# Patient Record
Sex: FEMALE | Race: BLACK OR AFRICAN AMERICAN
[De-identification: names, ages, dates, MRNs, and addresses within clinical notes are randomized per-mention and may not be internally consistent; named-entity substitution may affect disease eponyms.]

---

## 2017-04-14 ENCOUNTER — HOSPITAL ENCOUNTER (EMERGENCY)
Dept: HOSPITAL 75 - ER | Age: 22
Discharge: HOME | End: 2017-04-14
Payer: SELF-PAY

## 2017-04-14 VITALS — HEIGHT: 64 IN | WEIGHT: 110 LBS | BODY MASS INDEX: 18.78 KG/M2
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
## 2017-04-14 DIAGNOSIS — S51.811A: Primary | ICD-10-CM

## 2017-04-14 DIAGNOSIS — F17.210: ICD-10-CM

## 2017-04-14 PROCEDURE — 99282 EMERGENCY DEPT VISIT SF MDM: CPT

## 2017-04-14 PROCEDURE — 73090 X-RAY EXAM OF FOREARM: CPT

## 2017-04-14 NOTE — ED UPPER EXTREMITY
General


Chief Complaint:  Laceration


Stated Complaint:  RIGHT ARM LAC


Nursing Triage Note:  


PT HIT A WINDOW IN ANGER AND HER RIGHT ARM WENT THROUGH IT.  PT HAS TWO DEEP 


LACERATIONS APPROXIMATELY 2 INCHES LONG TO THE RIGHT FOREARM.  BLEEDING IS 


MINIMAL AT THIS TIME.  RIGHT RADIAL PULSE PRESENT


Source:  patient


Exam Limitations:  no limitations





History of Present Illness


Time seen by provider:  11:00


Initial Comments


to ER with laceration to the volar side of the right forearm after she punched 

a window at home out of anger


Onset:  just prior to arrival


Severity:  moderate


Pain/Injury Location:  right forearm


Method of Injury:  unknown


Modifying Factors:  Worse With Movement





Allergies and Home Medications


Allergies


Coded Allergies:  


     No Known Drug Allergies (Verified  Allergy, Unknown, 12/4/07)





Constitutional:  see HPI


EENTM:  see HPI


Respiratory:  no symptoms reported


Cardiovascular:  no symptoms reported


Genitourinary:  no symptoms reported


Musculoskeletal:  no symptoms reported


Skin:  see HPI


Psychiatric/Neurological:  No Symptoms Reported





Past Medical-Social-Family Hx


Patient Social History


Alcohol Use:  Occasionally Uses


Recreational Drug Use:  Yes


Drug of Choice:  MARIJUANA


Smoking Status:  Current Everyday Smoker


Type Used:  Cigarettes


Recent Foreign Travel:  No


Contact w/Someone Who Travel:  No


Recent Infectious Disease Expo:  No


Recent Hopitalizations:  Yes (only at age 6 when spleen repaired and 

appendectomy )





Surgeries


HX Surgeries:  Yes


Surgeries:  Abdominal, Eye Surgery





Respiratory


Hx Respiratory Disorders:  No





Cardiovascular


Hx Cardiac Disorders:  No





Neurological


Hx Neurological Disorders:  No





Reproductive System


Hx Reproductive Disorders:  No


Female Reproductive Disorders:  Denies





Genitourinary


Hx Genitourinary Disorders:  Yes


Genitourinary Disorders:  UTI-Chronic





Gastrointestinal


Hx Gastrointestinal Disorders:  Yes





Musculoskeletal


Hx Musculoskeletal Disorders:  No





Endocrine


Hx Endocrine Disorders:  No





HEENT


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 )





Cancer


Hx Cancer:  No





Psychosocial


Hx Psychiatric Problems:  No





Integumentary


HX Skin/Integumentary Disorder:  No





Blood Transfusions


Hx Blood Disorders:  No





Physical Exam


Vital Signs





Vital Sign - Last 12Hours








 4/14/17





 10:43


 


Temp 98.0


 


Pulse 70


 


Resp 16


 


B/P (MAP) 127/102





Capillary Refill :


General Appearance:  WD/WN, no apparent distress


HEENT:  PERRL/EOMI, normal ENT inspection


Neck:  non-tender, full range of motion


Respiratory:  no respiratory distress, no accessory muscle use


Gastrointestinal:  non tender, soft


Shoulder:  normal inspection, non-tender


Elbow/Forearm:  Right, limited ROM (several lacerations to the volar surface of 

the right forearm to the longest her about 4 cm in length and depth is to the 

muscle fascia.  She has strong radial pulse.  I'm not able to palpate the ulnar 

pulse but she has a positive Oumar's test.)





Laceration Repair :  


   Wound Location:  Upper Extremities


   Wound Length (cm):  9


   Wound's Depth, Shape:  into muscle


   Wound Explored:  clean


   Irrigated w/ Saline (ccs):  200


   Anesthesia:  Lidocaine w/ Epi


   Volume Anesthetic (ccs):  10


   Suture:  Prolene


   Suture Size:  4-0


   Number of Sutures:  9


   Layer Closure?:  1


   Number Deep Layer Sutures:  0


Progress


10 mL of 1 percent lidocaine with epinephrine.  Wound was then closed with a 

total of 7 simple interrupted sutures size 4-0 Prolene and 2 running sutures 

size 4-0 Prolene.





Progress/Results/Core Measures


Results/Orders


My Orders





Orders - BRANDY HALL


Lidocaine/Epi 1% 1:100,000 (Xylocaine /E (4/14/17 11:00)


Forearm, Right, 2 Views (4/14/17 11:00)





Medications Given in ED





Current Medications








 Medications  Dose


 Ordered  Sig/Dheeraj


 Route  Start Time


 Stop Time Status Last Admin


Dose Admin


 


 Lidocaine/


 Epinephrine  20 ml  ONCE  ONCE


 INJ  4/14/17 11:00


 4/14/17 11:01 DC 4/14/17 11:11


10 ML








Vital Signs/I&O





Vital Sign - Last 12Hours








 4/14/17





 10:43


 


Temp 98.0


 


Pulse 70


 


Resp 16


 


B/P (MAP) 127/102











Departure


Impression


Impression:  


 Primary Impression:  


 Forearm laceration


Disposition:  01 HOME, SELF-CARE


Condition:  Stable





Departure-Patient Inst.


Decision time for Depature:  11:43


Referrals:  


[facility] (PCP/Family)


Primary Care Physician


Patient Instructions:  Laceration Repair With Stitches (DC)





Add. Discharge Instructions:  


1.  Keep this area clean and covered and dry today


2.  Starting tomorrow you may take the bandage off leaving it open to air.  If 

you're at work he may keep it covered with a Band-Aid


3.  You may let water run over it in the shower starting tomorrow


4.  Return to ER for any infection signs such as redness, pus like drainage or 

any other concerns


5.  Keep the dressing on it today


6.  Antibiotics as directed


7.  Return to the emergency room in 7-10 days, probably closer to 10 days to 

have the stitches removed


 All discharge instructions reviewed with patient and/or family. Voiced 

understanding.


Scripts


Cephalexin (Cephalexin) 500 Mg Capsule


500 MG PO TID for 7 Days, CAP


   Prov: BRANDY HALL         4/14/17











BRANDY HALL Apr 14, 2017 11:01

## 2017-04-14 NOTE — DIAGNOSTIC IMAGING REPORT
INDICATION: Lacerations by glass.



FINDINGS: Extensive soft tissue irregularities involving the mid

third of the forearm along its palmar and ulnar aspect. Air and

fluid and soft tissue distortion owing to the lacerations could

obscure an underlying foreign body. No opaque foreign body is

found. There is no fracture.



IMPRESSION: Extensive soft tissue injury, but no appreciable

retained opaque foreign body or osseous injury.



Dictated by:



Dictated on workstation # [id number]

## 2017-04-22 ENCOUNTER — HOSPITAL ENCOUNTER (EMERGENCY)
Dept: HOSPITAL 75 - ER | Age: 22
Discharge: HOME | End: 2017-04-22
Payer: SELF-PAY

## 2017-04-22 VITALS — DIASTOLIC BLOOD PRESSURE: 74 MMHG | SYSTOLIC BLOOD PRESSURE: 138 MMHG

## 2017-04-22 VITALS — BODY MASS INDEX: 18.85 KG/M2 | HEIGHT: 64 IN | WEIGHT: 110.38 LBS

## 2017-04-22 DIAGNOSIS — S51.811D: Primary | ICD-10-CM

## 2017-04-25 ENCOUNTER — HOSPITAL ENCOUNTER (EMERGENCY)
Dept: HOSPITAL 75 - ER | Age: 22
Discharge: HOME | End: 2017-04-25
Payer: SELF-PAY

## 2017-04-25 VITALS — HEIGHT: 64 IN | BODY MASS INDEX: 18.44 KG/M2 | WEIGHT: 108 LBS

## 2017-04-25 VITALS — SYSTOLIC BLOOD PRESSURE: 120 MMHG | DIASTOLIC BLOOD PRESSURE: 89 MMHG

## 2017-04-25 DIAGNOSIS — S51.811D: Primary | ICD-10-CM

## 2017-04-28 ENCOUNTER — HOSPITAL ENCOUNTER (EMERGENCY)
Dept: HOSPITAL 75 - ER | Age: 22
Discharge: HOME | End: 2017-04-28
Payer: SELF-PAY

## 2017-04-28 VITALS — BODY MASS INDEX: 18.44 KG/M2 | WEIGHT: 108 LBS | HEIGHT: 64.25 IN

## 2017-04-28 DIAGNOSIS — F12.90: ICD-10-CM

## 2017-04-28 DIAGNOSIS — R55: Primary | ICD-10-CM

## 2017-04-28 LAB
ALBUMIN SERPL-MCNC: 4.1 G/DL (ref 3.2–4.5)
ALT SERPL-CCNC: 6 U/L (ref 0–55)
ANION GAP SERPL CALC-SCNC: 11 MMOL/L (ref 5–14)
AST SERPL-CCNC: 16 U/L (ref 5–34)
BASOPHILS # BLD AUTO: 0 10^3/UL (ref 0–0.1)
BASOPHILS NFR BLD AUTO: 1 % (ref 0–10)
BILIRUB SERPL-MCNC: 0.6 MG/DL (ref 0.1–1)
BILIRUB UR QL STRIP: NEGATIVE
BUN SERPL-MCNC: 15 MG/DL (ref 7–18)
BUN/CREAT SERPL: 20
CALCIUM SERPL-MCNC: 9.2 MG/DL (ref 8.5–10.1)
CHLORIDE SERPL-SCNC: 107 MMOL/L (ref 98–107)
CO2 SERPL-SCNC: 19 MMOL/L (ref 21–32)
CREAT SERPL-MCNC: 0.74 MG/DL (ref 0.6–1.3)
EOSINOPHIL # BLD AUTO: 0.1 10^3/UL (ref 0–0.3)
EOSINOPHIL NFR BLD AUTO: 2 % (ref 0–10)
ERYTHROCYTE [DISTWIDTH] IN BLOOD BY AUTOMATED COUNT: 16.3 % (ref 10–14.5)
GFR SERPLBLD BASED ON 1.73 SQ M-ARVRAT: > 60 ML/MIN
GLUCOSE SERPL-MCNC: 81 MG/DL (ref 70–105)
KETONES UR QL STRIP: NEGATIVE
LEUKOCYTE ESTERASE UR QL STRIP: NEGATIVE
LYMPHOCYTES # BLD AUTO: 1.6 X 10^3 (ref 1–4)
LYMPHOCYTES NFR BLD AUTO: 29 % (ref 12–44)
MCH RBC QN AUTO: 20 PG (ref 25–34)
MCHC RBC AUTO-ENTMCNC: 30 G/DL (ref 32–36)
MCV RBC AUTO: 66 FL (ref 80–99)
MONOCYTES # BLD AUTO: 0.7 X 10^3 (ref 0–1)
MONOCYTES NFR BLD AUTO: 12 % (ref 0–12)
NEUTROPHILS # BLD AUTO: 3 X 10^3 (ref 1.8–7.8)
NEUTROPHILS NFR BLD AUTO: 56 % (ref 42–75)
NITRITE UR QL STRIP: NEGATIVE
PH UR STRIP: 8 [PH] (ref 5–9)
PLATELET # BLD: 211 10^3/UL (ref 130–400)
PMV BLD AUTO: 11.9 FL (ref 7.4–10.4)
POTASSIUM SERPL-SCNC: 3.8 MMOL/L (ref 3.6–5)
PROT SERPL-MCNC: 7.2 G/DL (ref 6.4–8.2)
PROT UR QL STRIP: NEGATIVE
RBC # BLD AUTO: 4.79 10^6/UL (ref 4.35–5.85)
SODIUM SERPL-SCNC: 137 MMOL/L (ref 135–145)
SP GR UR STRIP: 1.01 (ref 1.02–1.02)
SQUAMOUS #/AREA URNS HPF: (no result) /HPF
UROBILINOGEN UR-MCNC: NORMAL MG/DL
WBC # BLD AUTO: 5.4 10^3/UL (ref 4.3–11)
WBC #/AREA URNS HPF: (no result) /HPF

## 2017-04-28 PROCEDURE — 85379 FIBRIN DEGRADATION QUANT: CPT

## 2017-04-28 PROCEDURE — 36415 COLL VENOUS BLD VENIPUNCTURE: CPT

## 2017-04-28 PROCEDURE — 84703 CHORIONIC GONADOTROPIN ASSAY: CPT

## 2017-04-28 PROCEDURE — 80053 COMPREHEN METABOLIC PANEL: CPT

## 2017-04-28 PROCEDURE — 96360 HYDRATION IV INFUSION INIT: CPT

## 2017-04-28 PROCEDURE — 80306 DRUG TEST PRSMV INSTRMNT: CPT

## 2017-04-28 PROCEDURE — 85025 COMPLETE CBC W/AUTO DIFF WBC: CPT

## 2017-04-28 PROCEDURE — 81000 URINALYSIS NONAUTO W/SCOPE: CPT

## 2017-04-28 NOTE — ED SYNCOPE
General


Chief Complaint:  Dizziness/Syncope


Stated Complaint:  FAINTING


Source of Information:  Patient


Exam Limitations:  No Limitations





History of Present Illness


Time Seen by Provider:  18:20


Initial Comments


To ER with c/o syncope tonight while at work at [employer].  She 

states that immediately before passing out she felt very hot and flushed and 

she could hear voices seemingly getting further away.  She states that she then 

remembers just waking up.  Afterwards she did vomit once.  She's never passed 

out before though she did nearly pass out a few days ago while she was here 

having sutures removed from the right forearm.  She did smoke marijuana earlier 

today.


Timing/Prior Episodes:  No Prior History


Precipitating Factors:  None


Loss of Consciousness:  No Loss of Consciousness


Current Symptoms:  No Headache, No Nausea





Allergies and Home Medications


Allergies


Coded Allergies:  


     No Known Drug Allergies (Verified  Allergy, Unknown, 12/4/07)





Home Medications


Cephalexin 500 Mg Capsule, 500 MG PO TID for 7 Days


   Prescribed by: BRANDY HALL on 4/14/17 1385





Constitutional:  see HPI


EENTM:  see HPI


Respiratory:  see HPI, No cough, No hemoptysis, No short of breath


Cardiovascular:  no symptoms reported


Genitourinary:  no symptoms reported


Musculoskeletal:  no symptoms reported


Skin:  no symptoms reported


Psychiatric/Neurological:  No Symptoms Reported





Past Medical-Social-Family Hx


Patient Social History


Drug of Choice:  MARIJUANA


Type Used:  Cigarettes


Recent Hopitalizations:  Yes (only at age 6 when spleen repaired and 

appendectomy )





Surgeries


HX Surgeries:  Yes


Surgeries:  Abdominal, Eye Surgery





Respiratory


Hx Respiratory Disorders:  No





Cardiovascular


Hx Cardiac Disorders:  No





Neurological


Hx Neurological Disorders:  No





Reproductive System


Hx Reproductive Disorders:  No


Female Reproductive Disorders:  Denies





Genitourinary


Hx Genitourinary Disorders:  Yes


Genitourinary Disorders:  UTI-Chronic





Gastrointestinal


Hx Gastrointestinal Disorders:  Yes





Musculoskeletal


Hx Musculoskeletal Disorders:  No





Endocrine


Hx Endocrine Disorders:  No





HEENT


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 )





Cancer


Hx Cancer:  No





Psychosocial


Hx Psychiatric Problems:  No





Integumentary


HX Skin/Integumentary Disorder:  No





Blood Transfusions


Hx Blood Disorders:  No





Physical Exam


Vital Signs





Vital Sign - Last 12Hours








 4/28/17





 18:14


 


Temp 99.2


 


Pulse 97


 


Resp 20


 


B/P (MAP) 129/98


 


O2 Delivery Room Air





Capillary Refill :


General Appearance:  No Apparent Distress, WD/WN


HEENT:  PERRL/EOMI, TMs Normal


Neck:  Full Range of Motion, Normal Inspection


Cardiovascular:  Regular Rate, Rhythm, Normal Peripheral Pulses


Respiratory:  Normal Breath Sounds, No Accessory Muscle Use, No Respiratory 

Distress


Gastrointestinal:  Non Tender, Soft


Neurologic/Psychiatric:  Alert, Oriented x3, No Motor/Sensory Deficits


Cranial Nerves:  Normal Hearing, Normal Speech, PERRL


Skin:  Normal Color, Warm/Dry





Laceration Repair :  


   Suture Size:  4-0





Progress/Results/Core Measures


Results/Orders


Lab Results





Laboratory Tests








Test


  4/28/17


18:43 4/28/17


18:44 Range/Units


 


 


White Blood Count


  5.4 


  


  4.3-11.0


10^3/uL


 


Red Blood Count


  4.79 


  


  4.35-5.85


10^6/uL


 


Hemoglobin 9.6 L  11.5-16.0  G/DL


 


Hematocrit 32 L  35-52  %


 


Mean Corpuscular Volume 66 L  80-99  FL


 


Mean Corpuscular Hemoglobin 20 L  25-34  PG


 


Mean Corpuscular Hemoglobin


Concent 30 L


  


  32-36  G/DL


 


 


Red Cell Distribution Width 16.3 H  10.0-14.5  %


 


Platelet Count


  211 


  


  130-400


10^3/uL


 


Mean Platelet Volume 11.9 H  7.4-10.4  FL


 


Neutrophils (%) (Auto) 56   42-75  %


 


Lymphocytes (%) (Auto) 29   12-44  %


 


Monocytes (%) (Auto) 12   0-12  %


 


Eosinophils (%) (Auto) 2   0-10  %


 


Basophils (%) (Auto) 1   0-10  %


 


Neutrophils # (Auto) 3.0   1.8-7.8  X 10^3


 


Lymphocytes # (Auto) 1.6   1.0-4.0  X 10^3


 


Monocytes # (Auto) 0.7   0.0-1.0  X 10^3


 


Eosinophils # (Auto)


  0.1 


  


  0.0-0.3


10^3/uL


 


Basophils # (Auto)


  0.0 


  


  0.0-0.1


10^3/uL


 


D-Dimer


  0.35 


  


  0.00-0.49


UG/ML


 


Sodium Level 137   135-145  MMOL/L


 


Potassium Level 3.8   3.6-5.0  MMOL/L


 


Chloride Level 107     MMOL/L


 


Carbon Dioxide Level 19 L  21-32  MMOL/L


 


Anion Gap 11   5-14  MMOL/L


 


Blood Urea Nitrogen 15   7-18  MG/DL


 


Creatinine


  0.74 


  


  0.60-1.30


MG/DL


 


Estimat Glomerular Filtration


Rate > 60 


  


   


 


 


BUN/Creatinine Ratio 20    


 


Glucose Level 81     MG/DL


 


Calcium Level 9.2   8.5-10.1  MG/DL


 


Total Bilirubin 0.6   0.1-1.0  MG/DL


 


Aspartate Amino Transf


(AST/SGOT) 16 


  


  5-34  U/L


 


 


Alanine Aminotransferase


(ALT/SGPT) 6 


  


  0-55  U/L


 


 


Alkaline Phosphatase 43     U/L


 


Total Protein 7.2   6.4-8.2  G/DL


 


Albumin 4.1   3.2-4.5  G/DL


 


Urine Color  YELLOW   


 


Urine Clarity


  


  SLIGHTLY


CLOUDY  


 


 


Urine pH  8  5-9  


 


Urine Specific Gravity  1.015 L 1.016-1.022  


 


Urine Protein  NEGATIVE  NEGATIVE  


 


Urine Glucose (UA)  NEGATIVE  NEGATIVE  


 


Urine Ketones  NEGATIVE  NEGATIVE  


 


Urine Nitrite  NEGATIVE  NEGATIVE  


 


Urine Bilirubin  NEGATIVE  NEGATIVE  


 


Urine Urobilinogen  NORMAL  NORMAL  MG/DL


 


Urine Leukocyte Esterase  NEGATIVE  NEGATIVE  


 


Urine RBC (Auto)  NEGATIVE  NEGATIVE  


 


Urine RBC  NONE   /HPF


 


Urine WBC  NONE   /HPF


 


Urine Squamous Epithelial


Cells 


  NONE 


   /HPF


 


 


Urine Crystals  NONE   /LPF


 


Urine Amorphous Sediment


  


  LARGE BRENTON


PHOSPHATE H  /LPF


 


 


Urine Bacteria  NEGATIVE   /HPF


 


Urine Casts  NONE   /LPF


 


Urine Mucus  NEGATIVE   /LPF


 


Urine Culture Indicated  NO   


 


Urine Opiates Screen  NEGATIVE  NEGATIVE  


 


Urine Oxycodone Screen  NEGATIVE  NEGATIVE  


 


Urine Methadone Screen  NEGATIVE  NEGATIVE  


 


Urine Propoxyphene Screen  NEGATIVE  NEGATIVE  


 


Urine Barbiturates Screen  NEGATIVE  NEGATIVE  


 


Ur Tricyclic Antidepressants


Screen 


  NEGATIVE 


  NEGATIVE  


 


 


Urine Phencyclidine Screen  NEGATIVE  NEGATIVE  


 


Urine Amphetamines Screen  NEGATIVE  NEGATIVE  


 


Urine Methamphetamines Screen  NEGATIVE  NEGATIVE  


 


Urine Benzodiazepines Screen  NEGATIVE  NEGATIVE  


 


Urine Cocaine Screen  NEGATIVE  NEGATIVE  


 


Urine Cannabinoids Screen  POSITIVE H NEGATIVE  








My Orders





Orders - BRANDY HALL APRMARCIO


Cbc With Automated Diff (4/28/17 18:19)


Fibrin Degradation Products (4/28/17 18:19)


Comprehensive Metabolic Panel (4/28/17 18:19)


Continuous Ekg Monitoring (4/28/17 18:19)


Saline Lock/Iv-Start (4/28/17 18:19)


Ns Iv 1000 Ml (Sodium Chloride 0.9%) (4/28/17 18:30)


Ua Culture If Indicated (4/28/17 18:27)


Urine Pregnancy Bedside (4/28/17 18:27)


Drug Screen Stat (Urine) (4/28/17 18:27)





Vital Signs/I&O





Vital Sign - Last 12Hours








 4/28/17





 18:14


 


Temp 99.2


 


Pulse 97


 


Resp 20


 


B/P (MAP) 129/98


 


O2 Delivery Room Air











Departure


Communication


Progress Notes


1918-ALert, laughing, pleasant, stable vitals during ER stay.l





Impression


Impression:  


 Primary Impression:  


 Syncope and collapse


Disposition:  01 HOME, SELF-CARE


Condition:  Stable





Departure-Patient Inst.


Decision time for Depature:  19:18


Referrals:  


NO,LOCAL PHYSICIAN (PCP/Family)


Primary Care Physician


Patient Instructions:  Syncope (Fainting) (DC)





Add. Discharge Instructions:  


1. Drink plenty of fluids


2. Return to ER for any recurrent lightheadedness, palpitations, shortness of 

breath or other concerns


All discharge instructions reviewed with patient and/or family. Voiced 

understanding.











BRANDY HALL APRN Apr 28, 2017 18:22

## 2018-01-25 ENCOUNTER — HOSPITAL ENCOUNTER (OUTPATIENT)
Dept: HOSPITAL 75 - RAD | Age: 23
End: 2018-01-25
Attending: FAMILY MEDICINE
Payer: MEDICAID

## 2018-01-25 DIAGNOSIS — Z34.01: Primary | ICD-10-CM

## 2018-01-25 DIAGNOSIS — Z3A.09: ICD-10-CM

## 2018-01-25 PROCEDURE — 76801 OB US < 14 WKS SINGLE FETUS: CPT

## 2018-01-25 NOTE — DIAGNOSTIC IMAGING REPORT
PROCEDURE: US OB SINGLE FETUS <14 WKS.



TECHNIQUE: Multiple real-time grayscale images were obtained over

the gravid uterus in various projections. 



INDICATION: Dating.



FINDINGS: There is an intrauterine gestational sac containing a

fetal pole. Crown-rump length measurement is 2.6 cm consistent

with 9 weeks 3 days gestation. Fetal heart rate was recorded at

169 beats per minute. No miley-gestational sac hemorrhage is

detected. Yolk sac is visualized. The ovaries are not visualized.

No adnexal mass or free fluid is seen.



IMPRESSION: Single live IUP 9 weeks 3 days gestational age. The

estimated date of confinement sonographically is 08/27/2018.



Dictated by: 



  Dictated on workstation # [id number]

## 2018-07-02 ENCOUNTER — HOSPITAL ENCOUNTER (OUTPATIENT)
Dept: HOSPITAL 75 - LAB | Age: 23
End: 2018-07-02
Attending: FAMILY MEDICINE
Payer: MEDICAID

## 2018-07-02 DIAGNOSIS — O36.1930: Primary | ICD-10-CM

## 2018-07-02 PROCEDURE — 86886 COOMBS TEST INDIRECT TITER: CPT

## 2018-07-26 ENCOUNTER — HOSPITAL ENCOUNTER (OUTPATIENT)
Dept: HOSPITAL 75 - RAD | Age: 23
End: 2018-07-26
Attending: FAMILY MEDICINE
Payer: MEDICAID

## 2018-07-26 DIAGNOSIS — Z3A.35: ICD-10-CM

## 2018-07-26 DIAGNOSIS — O26.843: Primary | ICD-10-CM

## 2018-07-26 PROCEDURE — 76816 OB US FOLLOW-UP PER FETUS: CPT

## 2018-07-26 NOTE — DIAGNOSTIC IMAGING REPORT
INDICATION: Decreased fundal height.



TECHNIQUE: Multiple real-time grayscale images were obtained over

the gravid uterus.



COMPARISON: 01/25/2018.



FINDINGS: The previous OB ultrasound exam of 01/25/2018 noted a

single live fetus at approximately 9 weeks 3 days gestation. On

this exam, the fetus is again identified. The fetus is cephalic

in presentation. Fetal heart motion was noted and a rate of 160

BPM was recorded. A complete fetal survey was not obtained.

Reportedly, the patient has had a prior fetal survey at [facility] in [place], [place].



The amount of fluid volume is 12 cm (normal 8 to 22 cm). The

placenta is anterior and there is no previa. The placenta is

grade 3.



The fetal growth parameters average 35 weeks 4 days +/-3.5 weeks.

The estimated fetal weight is in the 74th percentile.



The cervix was not well visualized due to the low position of the

fetal head.



IMPRESSION:

1. There is a single live fetus at approximately 35 weeks 3 days

gestation +/-1 week. The EDC remains August 27, 2018.

2. There were no obvious fetal abnormalities identified.

3. The fetal growth parameters have progressed as expected since

the prior exam.



Biometrical measurements are as follows:

Biparietal 8.3 cm, age 33 weeks 3 days.

Head circumference 31.31 cm, age 35 weeks 1 days.

Abdominal circumference 33.52 cm, age 37 weeks 3 days.

Femur length 7.03 cm, age 36 weeks 1 days.



Sonographic estimate age: 35 weeks 4 days.

Sonographic estimated date of delivery: 8/26/2018.



Estimated Fetal Weight: 2915 gm (+/- 426  gm).

LMP percentile: 74%.



Fetal heart rate: 160 beats per minute.



Fetal number: 1 of 1.



Dictated by: 



  Dictated on workstation # [id number]

## 2018-08-26 ENCOUNTER — HOSPITAL ENCOUNTER (OUTPATIENT)
Dept: HOSPITAL 75 - WSO | Age: 23
Discharge: HOME | End: 2018-08-26
Attending: FAMILY MEDICINE
Payer: MEDICAID

## 2018-08-26 VITALS — BODY MASS INDEX: 24.7 KG/M2 | WEIGHT: 148.25 LBS | HEIGHT: 65 IN

## 2018-08-26 VITALS — DIASTOLIC BLOOD PRESSURE: 76 MMHG | SYSTOLIC BLOOD PRESSURE: 133 MMHG

## 2018-08-26 VITALS — DIASTOLIC BLOOD PRESSURE: 87 MMHG | SYSTOLIC BLOOD PRESSURE: 152 MMHG

## 2018-08-26 VITALS — DIASTOLIC BLOOD PRESSURE: 77 MMHG | SYSTOLIC BLOOD PRESSURE: 130 MMHG

## 2018-08-26 DIAGNOSIS — Z3A.39: ICD-10-CM

## 2018-08-26 DIAGNOSIS — O47.1: Primary | ICD-10-CM

## 2018-08-26 PROCEDURE — 96374 THER/PROPH/DIAG INJ IV PUSH: CPT

## 2018-08-26 PROCEDURE — 99214 OFFICE O/P EST MOD 30 MIN: CPT

## 2018-08-26 PROCEDURE — 96361 HYDRATE IV INFUSION ADD-ON: CPT

## 2018-08-28 ENCOUNTER — HOSPITAL ENCOUNTER (OUTPATIENT)
Dept: HOSPITAL 75 - WSO | Age: 23
Discharge: HOME | End: 2018-08-28
Attending: FAMILY MEDICINE
Payer: MEDICAID

## 2018-08-28 VITALS — DIASTOLIC BLOOD PRESSURE: 82 MMHG | SYSTOLIC BLOOD PRESSURE: 131 MMHG

## 2018-08-28 VITALS — WEIGHT: 148.25 LBS | BODY MASS INDEX: 24.7 KG/M2 | HEIGHT: 65 IN

## 2018-08-28 VITALS — DIASTOLIC BLOOD PRESSURE: 86 MMHG | SYSTOLIC BLOOD PRESSURE: 127 MMHG

## 2018-08-28 VITALS — DIASTOLIC BLOOD PRESSURE: 86 MMHG | SYSTOLIC BLOOD PRESSURE: 138 MMHG

## 2018-08-28 VITALS — SYSTOLIC BLOOD PRESSURE: 135 MMHG | DIASTOLIC BLOOD PRESSURE: 97 MMHG

## 2018-08-28 DIAGNOSIS — Z3A.40: ICD-10-CM

## 2018-08-28 DIAGNOSIS — O47.1: Primary | ICD-10-CM

## 2018-08-28 PROCEDURE — 99213 OFFICE O/P EST LOW 20 MIN: CPT

## 2018-08-28 NOTE — PHYSICIAN QUERY-FINAL DX
NATALIO EDWARDS 8/28/18 1500:


Clinic Account Progress/Dx


Physician Query:


Please give diagnosis





Please provide dx and weeks of gestation.


Date of Service





Aug 26, 2018 at 16:49





STEFANI DUNCAN DO 9/12/18 0859:


Clinic Account Progress/Dx


DIAGNOSIS:


Diagnosis


39 week GA


Contractions, not in active labor











NATALIO EDWARDS Aug 28, 2018 15:00


STEFANI DUNCAN DO Sep 12, 2018 08:59

## 2018-08-29 ENCOUNTER — HOSPITAL ENCOUNTER (INPATIENT)
Dept: HOSPITAL 75 - LDRP | Age: 23
LOS: 3 days | Discharge: HOME | End: 2018-09-01
Attending: FAMILY MEDICINE | Admitting: FAMILY MEDICINE
Payer: MEDICAID

## 2018-08-29 VITALS — DIASTOLIC BLOOD PRESSURE: 81 MMHG | SYSTOLIC BLOOD PRESSURE: 127 MMHG

## 2018-08-29 VITALS — BODY MASS INDEX: 25.68 KG/M2 | HEIGHT: 65 IN | WEIGHT: 154.13 LBS

## 2018-08-29 VITALS — DIASTOLIC BLOOD PRESSURE: 81 MMHG | SYSTOLIC BLOOD PRESSURE: 129 MMHG

## 2018-08-29 DIAGNOSIS — D50.9: ICD-10-CM

## 2018-08-29 DIAGNOSIS — O99.013: ICD-10-CM

## 2018-08-29 DIAGNOSIS — Z3A.40: ICD-10-CM

## 2018-08-29 DIAGNOSIS — O36.0130: ICD-10-CM

## 2018-08-29 DIAGNOSIS — F41.9: ICD-10-CM

## 2018-08-29 DIAGNOSIS — O48.0: Primary | ICD-10-CM

## 2018-08-29 DIAGNOSIS — Z87.891: ICD-10-CM

## 2018-08-29 DIAGNOSIS — F32.9: ICD-10-CM

## 2018-08-29 LAB
BASOPHILS # BLD AUTO: 0 10^3/UL (ref 0–0.1)
BASOPHILS NFR BLD AUTO: 0 % (ref 0–10)
EOSINOPHIL # BLD AUTO: 0.3 10^3/UL (ref 0–0.3)
EOSINOPHIL NFR BLD AUTO: 4 % (ref 0–10)
ERYTHROCYTE [DISTWIDTH] IN BLOOD BY AUTOMATED COUNT: 16.9 % (ref 10–14.5)
HCT VFR BLD CALC: 37 % (ref 35–52)
HGB BLD-MCNC: 12.5 G/DL (ref 11.5–16)
LYMPHOCYTES # BLD AUTO: 1.6 X 10^3 (ref 1–4)
LYMPHOCYTES NFR BLD AUTO: 18 % (ref 12–44)
MANUAL DIFFERENTIAL PERFORMED BLD QL: NO
MCH RBC QN AUTO: 27 PG (ref 25–34)
MCHC RBC AUTO-ENTMCNC: 34 G/DL (ref 32–36)
MCV RBC AUTO: 80 FL (ref 80–99)
MONOCYTES # BLD AUTO: 0.7 X 10^3 (ref 0–1)
MONOCYTES NFR BLD AUTO: 9 % (ref 0–12)
NEUTROPHILS # BLD AUTO: 5.9 X 10^3 (ref 1.8–7.8)
NEUTROPHILS NFR BLD AUTO: 69 % (ref 42–75)
PLATELET # BLD: 140 10^3/UL (ref 130–400)
PMV BLD AUTO: (no result) FL (ref 7.4–10.4)
RBC # BLD AUTO: 4.59 10^6/UL (ref 4.35–5.85)
WBC # BLD AUTO: 8.5 10^3/UL (ref 4.3–11)

## 2018-08-29 PROCEDURE — 85025 COMPLETE CBC W/AUTO DIFF WBC: CPT

## 2018-08-29 PROCEDURE — 86870 RBC ANTIBODY IDENTIFICATION: CPT

## 2018-08-29 PROCEDURE — 86901 BLOOD TYPING SEROLOGIC RH(D): CPT

## 2018-08-29 PROCEDURE — 88307 TISSUE EXAM BY PATHOLOGIST: CPT

## 2018-08-29 PROCEDURE — 86850 RBC ANTIBODY SCREEN: CPT

## 2018-08-29 PROCEDURE — 86900 BLOOD TYPING SEROLOGIC ABO: CPT

## 2018-08-29 PROCEDURE — 3E0P7GC INTRODUCTION OF OTHER THERAPEUTIC SUBSTANCE INTO FEMALE REPRODUCTIVE, VIA NATURAL OR ARTIFICIAL OPENING: ICD-10-PCS | Performed by: FAMILY MEDICINE

## 2018-08-29 PROCEDURE — 36415 COLL VENOUS BLD VENIPUNCTURE: CPT

## 2018-08-29 RX ADMIN — SODIUM CHLORIDE, SODIUM LACTATE, POTASSIUM CHLORIDE, CALCIUM CHLORIDE, AND DEXTROSE MONOHYDRATE SCH MLS/HR: 600; 310; 30; 20; 5 INJECTION, SOLUTION INTRAVENOUS at 20:05

## 2018-08-30 VITALS — DIASTOLIC BLOOD PRESSURE: 82 MMHG | SYSTOLIC BLOOD PRESSURE: 127 MMHG

## 2018-08-30 VITALS — SYSTOLIC BLOOD PRESSURE: 138 MMHG | DIASTOLIC BLOOD PRESSURE: 98 MMHG

## 2018-08-30 VITALS — DIASTOLIC BLOOD PRESSURE: 73 MMHG | SYSTOLIC BLOOD PRESSURE: 122 MMHG

## 2018-08-30 VITALS — DIASTOLIC BLOOD PRESSURE: 88 MMHG | SYSTOLIC BLOOD PRESSURE: 136 MMHG

## 2018-08-30 VITALS — DIASTOLIC BLOOD PRESSURE: 99 MMHG | SYSTOLIC BLOOD PRESSURE: 125 MMHG

## 2018-08-30 VITALS — DIASTOLIC BLOOD PRESSURE: 74 MMHG | SYSTOLIC BLOOD PRESSURE: 128 MMHG

## 2018-08-30 VITALS — DIASTOLIC BLOOD PRESSURE: 96 MMHG | SYSTOLIC BLOOD PRESSURE: 133 MMHG

## 2018-08-30 VITALS — SYSTOLIC BLOOD PRESSURE: 166 MMHG | DIASTOLIC BLOOD PRESSURE: 105 MMHG

## 2018-08-30 VITALS — SYSTOLIC BLOOD PRESSURE: 120 MMHG | DIASTOLIC BLOOD PRESSURE: 68 MMHG

## 2018-08-30 VITALS — SYSTOLIC BLOOD PRESSURE: 183 MMHG | DIASTOLIC BLOOD PRESSURE: 83 MMHG

## 2018-08-30 VITALS — SYSTOLIC BLOOD PRESSURE: 142 MMHG | DIASTOLIC BLOOD PRESSURE: 94 MMHG

## 2018-08-30 VITALS — SYSTOLIC BLOOD PRESSURE: 134 MMHG | DIASTOLIC BLOOD PRESSURE: 84 MMHG

## 2018-08-30 VITALS — SYSTOLIC BLOOD PRESSURE: 151 MMHG | DIASTOLIC BLOOD PRESSURE: 63 MMHG

## 2018-08-30 VITALS — SYSTOLIC BLOOD PRESSURE: 130 MMHG | DIASTOLIC BLOOD PRESSURE: 82 MMHG

## 2018-08-30 VITALS — DIASTOLIC BLOOD PRESSURE: 58 MMHG | SYSTOLIC BLOOD PRESSURE: 143 MMHG

## 2018-08-30 VITALS — DIASTOLIC BLOOD PRESSURE: 85 MMHG | SYSTOLIC BLOOD PRESSURE: 136 MMHG

## 2018-08-30 VITALS — DIASTOLIC BLOOD PRESSURE: 75 MMHG | SYSTOLIC BLOOD PRESSURE: 125 MMHG

## 2018-08-30 VITALS — SYSTOLIC BLOOD PRESSURE: 131 MMHG | DIASTOLIC BLOOD PRESSURE: 80 MMHG

## 2018-08-30 VITALS — DIASTOLIC BLOOD PRESSURE: 63 MMHG | SYSTOLIC BLOOD PRESSURE: 137 MMHG

## 2018-08-30 VITALS — SYSTOLIC BLOOD PRESSURE: 143 MMHG | DIASTOLIC BLOOD PRESSURE: 63 MMHG

## 2018-08-30 VITALS — DIASTOLIC BLOOD PRESSURE: 80 MMHG | SYSTOLIC BLOOD PRESSURE: 132 MMHG

## 2018-08-30 VITALS — DIASTOLIC BLOOD PRESSURE: 61 MMHG | SYSTOLIC BLOOD PRESSURE: 108 MMHG

## 2018-08-30 VITALS — DIASTOLIC BLOOD PRESSURE: 77 MMHG | SYSTOLIC BLOOD PRESSURE: 140 MMHG

## 2018-08-30 VITALS — DIASTOLIC BLOOD PRESSURE: 62 MMHG | SYSTOLIC BLOOD PRESSURE: 132 MMHG

## 2018-08-30 VITALS — SYSTOLIC BLOOD PRESSURE: 127 MMHG | DIASTOLIC BLOOD PRESSURE: 77 MMHG

## 2018-08-30 VITALS — SYSTOLIC BLOOD PRESSURE: 141 MMHG | DIASTOLIC BLOOD PRESSURE: 84 MMHG

## 2018-08-30 VITALS — DIASTOLIC BLOOD PRESSURE: 79 MMHG | SYSTOLIC BLOOD PRESSURE: 135 MMHG

## 2018-08-30 VITALS — DIASTOLIC BLOOD PRESSURE: 74 MMHG | SYSTOLIC BLOOD PRESSURE: 132 MMHG

## 2018-08-30 VITALS — DIASTOLIC BLOOD PRESSURE: 93 MMHG | SYSTOLIC BLOOD PRESSURE: 156 MMHG

## 2018-08-30 VITALS — DIASTOLIC BLOOD PRESSURE: 81 MMHG | SYSTOLIC BLOOD PRESSURE: 125 MMHG

## 2018-08-30 VITALS — SYSTOLIC BLOOD PRESSURE: 123 MMHG | DIASTOLIC BLOOD PRESSURE: 74 MMHG

## 2018-08-30 VITALS — SYSTOLIC BLOOD PRESSURE: 133 MMHG | DIASTOLIC BLOOD PRESSURE: 87 MMHG

## 2018-08-30 VITALS — DIASTOLIC BLOOD PRESSURE: 78 MMHG | SYSTOLIC BLOOD PRESSURE: 131 MMHG

## 2018-08-30 VITALS — DIASTOLIC BLOOD PRESSURE: 88 MMHG | SYSTOLIC BLOOD PRESSURE: 147 MMHG

## 2018-08-30 VITALS — DIASTOLIC BLOOD PRESSURE: 83 MMHG | SYSTOLIC BLOOD PRESSURE: 130 MMHG

## 2018-08-30 VITALS — SYSTOLIC BLOOD PRESSURE: 145 MMHG | DIASTOLIC BLOOD PRESSURE: 84 MMHG

## 2018-08-30 VITALS — DIASTOLIC BLOOD PRESSURE: 110 MMHG | SYSTOLIC BLOOD PRESSURE: 125 MMHG

## 2018-08-30 VITALS — SYSTOLIC BLOOD PRESSURE: 136 MMHG | DIASTOLIC BLOOD PRESSURE: 82 MMHG

## 2018-08-30 VITALS — DIASTOLIC BLOOD PRESSURE: 93 MMHG | SYSTOLIC BLOOD PRESSURE: 163 MMHG

## 2018-08-30 VITALS — SYSTOLIC BLOOD PRESSURE: 137 MMHG | DIASTOLIC BLOOD PRESSURE: 76 MMHG

## 2018-08-30 VITALS — DIASTOLIC BLOOD PRESSURE: 88 MMHG | SYSTOLIC BLOOD PRESSURE: 140 MMHG

## 2018-08-30 VITALS — SYSTOLIC BLOOD PRESSURE: 120 MMHG | DIASTOLIC BLOOD PRESSURE: 80 MMHG

## 2018-08-30 VITALS — SYSTOLIC BLOOD PRESSURE: 130 MMHG | DIASTOLIC BLOOD PRESSURE: 79 MMHG

## 2018-08-30 VITALS — DIASTOLIC BLOOD PRESSURE: 72 MMHG | SYSTOLIC BLOOD PRESSURE: 131 MMHG

## 2018-08-30 VITALS — DIASTOLIC BLOOD PRESSURE: 79 MMHG | SYSTOLIC BLOOD PRESSURE: 133 MMHG

## 2018-08-30 VITALS — SYSTOLIC BLOOD PRESSURE: 147 MMHG | DIASTOLIC BLOOD PRESSURE: 92 MMHG

## 2018-08-30 VITALS — DIASTOLIC BLOOD PRESSURE: 87 MMHG | SYSTOLIC BLOOD PRESSURE: 135 MMHG

## 2018-08-30 VITALS — DIASTOLIC BLOOD PRESSURE: 78 MMHG | SYSTOLIC BLOOD PRESSURE: 132 MMHG

## 2018-08-30 VITALS — SYSTOLIC BLOOD PRESSURE: 140 MMHG | DIASTOLIC BLOOD PRESSURE: 75 MMHG

## 2018-08-30 VITALS — DIASTOLIC BLOOD PRESSURE: 91 MMHG | SYSTOLIC BLOOD PRESSURE: 142 MMHG

## 2018-08-30 PROCEDURE — 0HQ9XZZ REPAIR PERINEUM SKIN, EXTERNAL APPROACH: ICD-10-PCS | Performed by: FAMILY MEDICINE

## 2018-08-30 RX ADMIN — Medication SCH MLS/HR: at 11:58

## 2018-08-30 RX ADMIN — FENTANYL CITRATE PRN MCG: 50 INJECTION, SOLUTION INTRAMUSCULAR; INTRAVENOUS at 05:31

## 2018-08-30 RX ADMIN — IBUPROFEN SCH MG: 600 TABLET ORAL at 15:30

## 2018-08-30 RX ADMIN — WATER SCH MLS/HR: 1 INJECTION INTRAMUSCULAR; INTRAVENOUS; SUBCUTANEOUS at 09:48

## 2018-08-30 RX ADMIN — Medication SCH MLS/HR: at 06:25

## 2018-08-30 RX ADMIN — Medication SCH MLS/HR: at 12:30

## 2018-08-30 RX ADMIN — WATER SCH MLS/HR: 1 INJECTION INTRAMUSCULAR; INTRAVENOUS; SUBCUTANEOUS at 05:31

## 2018-08-30 RX ADMIN — IBUPROFEN SCH MG: 600 TABLET ORAL at 22:08

## 2018-08-30 RX ADMIN — WATER SCH MLS/HR: 1 INJECTION INTRAMUSCULAR; INTRAVENOUS; SUBCUTANEOUS at 00:47

## 2018-08-30 RX ADMIN — FENTANYL CITRATE PRN MCG: 50 INJECTION, SOLUTION INTRAMUSCULAR; INTRAVENOUS at 03:41

## 2018-08-30 RX ADMIN — SODIUM CHLORIDE, SODIUM LACTATE, POTASSIUM CHLORIDE, CALCIUM CHLORIDE, AND DEXTROSE MONOHYDRATE SCH MLS/HR: 600; 310; 30; 20; 5 INJECTION, SOLUTION INTRAVENOUS at 04:00

## 2018-08-30 RX ADMIN — SODIUM CHLORIDE, SODIUM LACTATE, POTASSIUM CHLORIDE, CALCIUM CHLORIDE, AND DEXTROSE MONOHYDRATE SCH MLS/HR: 600; 310; 30; 20; 5 INJECTION, SOLUTION INTRAVENOUS at 08:49

## 2018-08-30 NOTE — XMS REPORT
[facility]

 Created on: 2018



Sid Pereira

External Reference #: [id number]

: 1995

Sex: Female



Demographics







 Address  [address] [place]  [address]

 

 Preferred Language  Unknown

 

 Marital Status  Unknown

 

 Yarsani Affiliation  Unknown

 

 Race  Unknown

 

 Ethnic Group  Unknown





Author







 Author  JONNY  TRESSA

 

 [facility]

 

 Address  [address] [place]  [address]



 

 Phone  [phone]







Care Team Providers







 Care Team Member Name  Role  Phone

 

 JONNY  TRESSA  Unavailable  [phone]







PROBLEMS







 Type  Condition  ICD9-CM Code  SMM55-OM Code  Onset Dates  Condition Status  
SNOMED Code

 

 Problem  Microcytic anemia     D50.9     Active  242501670

 

 Problem  Fever, unspecified     R50.9     Active  291955513

 

 Problem  Weight loss     R63.4     Active  12256254

 

 Problem  Dysmenorrhea     N94.6     Active  780020909

 

 Problem  Generalized anxiety disorder     F41.1     Active  91463508

 

 Problem  Sciatica of right side     M54.31     Active  76015441

 

 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 
unspecified fetus     O36.1910     Active  978900565

 

 Problem  Anemia affecting pregnancy in first trimester     O99.011     Active  
52183193

 

 Problem  Mild episode of recurrent major depressive disorder     F33.0     
Active  143876317

 

 Problem  Red blood cell antibody positive     R76.8     Active  037871842







ALLERGIES

No Information



ENCOUNTERS







 Encounter  Location  Date  Diagnosis

 

 [place] [id number] [place] [phone]-
[phone]     

 

 [place] [id number] [place] [address]-
8031     

 

 [place] [id number] [place] [phone]-
5984    Third trimester pregnancy Z34.93 and Encounter for 
immunization Z23

 

 [place] [address] [address]-
[address]  15 May, 2018  Janelle isoimmunization during pregnancy in first trimester, 
single or unspecified fetus O36.1910 ; Diabetes mellitus screening Z13.1 ; 25 
weeks gestation of pregnancy Z3A.25 and Prenatal care, first pregnancy in 
second trimester Z34.02

 

 [place] [address], [place] [address]     

 

 [place] [id number] [place] [address]-
7123    Janelle isoimmunization during pregnancy in second trimester, 
single or unspecified fetus O36.1920 and 20 weeks gestation of pregnancy Z3A.20

 

 [place] [id number] [place] [address]-
3751  15 Mar, 2018  Normal pregnancy, first Z34.00 and 16 weeks gestation of 
pregnancy Z3A.16

 

 [place] [id number] [place] [address]-
9953  15 2018  Normal pregnancy, first Z34.00 ; Sciatica of right side 
M54.31 ; 12 weeks gestation of pregnancy Z3A.12 and [place] isoimmunization during 
pregnancy in first trimester, single or unspecified fetus O36.1910

 

 [place] [id number] [place] [address]-
7594     

 

 [place] [address] [place] [address]-
4122    Normal pregnancy, first Z34.00 ; Anemia affecting pregnancy 
in first trimester O99.011 and 9 weeks gestation of pregnancy Z3A.09

 

 [place] [address], [place] [address]-
[address]     

 

 [place] [id number] [place] [address]-
5739     

 

 [place] [id number], [place] [address]-
[address]  10 Derik, 2018  8 weeks gestation of pregnancy Z3A.08 ; Fever, unspecified 
R50.9 ; Missed menses N92.6 and Weight loss R63.4

 

 [place] [address], [place] [address]-
8890  27 Dec, 2017   

 

 [place] [id number] [place] [phone]-
2146  14 Dec, 2017   

 

 [facility]  [id number], [place] [address]-
7638  08 Aug, 2017  Microcytic anemia D50.9 and Dysmenorrhea N94.6

 

 [facility]  [id number] [address]-
5646  07 Aug, 2017   

 

 [facility]  [id number], [place] [address]-
6452  03 Aug, 2017   

 

 [facility]  [id number] [address]-
7785    Dysmenorrhea N94.6

 

 [facility]  [address] [place] [address]-
[phone]    Dysmenorrhea N94.6

 

 [facility]  [id number] [address]-
9803  12 Oct, 2010   

 

 [facility]  [id number] [address]-
9298  12 Oct, 2010   

 

 [facility]  [address] [place] [address]-
[address]     

 

 [facility]  [id number] [address]-
7626     

 

 [facility]  [id number] [address]-
5449  13 Aug, 2007   

 

 [facility]  [id number], [place] [address]-
1769     







IMMUNIZATIONS

No Known Immunizations



SOCIAL HISTORY

Never Assessed



REASON FOR VISIT

OB Referral



PLAN OF CARE





VITAL SIGNS





MEDICATIONS

Unknown Medications



RESULTS

No Results



PROCEDURES

No Known procedures



INSTRUCTIONS





MEDICATIONS ADMINISTERED

No Known Medications



MEDICAL (GENERAL) HISTORY







 Type  Description  Date

 

 Medical History  depression   

 

 Medical History  anxiety   

 

 Medical History  PTSD   

 

 Surgical History  part of spleen removed  

 

 Surgical History  corneal transplant  2016

 

 Hospitalization History  surgery   

 

 Hospitalization History  suicidal ([place])

## 2018-08-30 NOTE — XMS REPORT
[facility]

 Created on: 2018



Sid Pereira

External Reference #: [id number]

: 1995

Sex: Female



Demographics







 Address  [address] [place]  [address]

 

 Preferred Language  Unknown

 

 Marital Status  Unknown

 

 Druze Affiliation  Unknown

 

 Race  Unknown

 

 Ethnic Group  Unknown





Author







 Author  TRESSA FULLER

 

 [facility]

 

 Address  [address] [place]  [address]



 

 Phone  [phone]







Care Team Providers







 Care Team Member Name  Role  Phone

 

 TRESSA FULLER  Unavailable  [phone]







PROBLEMS







 Type  Condition  ICD9-CM Code  KYD78-WB Code  Onset Dates  Condition Status  
SNOMED Code

 

 Problem  Microcytic anemia     D50.9     Active  399932447

 

 Problem  Fever, unspecified     R50.9     Active  686534734

 

 Problem  Weight loss     R63.4     Active  31327890

 

 Problem  Dysmenorrhea     N94.6     Active  659967532

 

 Problem  Generalized anxiety disorder     F41.1     Active  38360293

 

 Problem  Sciatica of right side     M54.31     Active  84073942

 

 Problem  [place] isoimmunization during pregnancy in first trimester, single or 
unspecified fetus     O36.1910     Active  985198943

 

 Problem  Anemia affecting pregnancy in first trimester     O99.011     Active  
13003814

 

 Problem  Mild episode of recurrent major depressive disorder     F33.0     
Active  071921650

 

 Problem  Red blood cell antibody positive     R76.8     Active  200626782







ALLERGIES







 Substance  Reaction  Event Type  Date  Status

 

 Oxycodone HCl  nausea  Drug Allergy  15 Mar, 2018  Active







ENCOUNTERS







 Encounter  Location  Date  Diagnosis

 

 [place] [id number] [place] [address]-
7683     

 

 [place] [id number] [place] [address]53-
2006    32 weeks gestation of pregnancy Z3A.32 and Prenatal care, 
first pregnancy in third trimester Z34.03

 

 [place] [id number] [place] [address]-
7166    Third trimester pregnancy Z34.93 ; Encounter for 
immunization Z23 and 30 weeks gestation of pregnancy Z3A.30

 

 [place] [address] [place] [address]-
5525  15 May, 2018  [place] isoimmunization during pregnancy in first trimester, 
single or unspecified fetus O36.1910 ; Diabetes mellitus screening Z13.1 ; 25 
weeks gestation of pregnancy Z3A.25 and Prenatal care, first pregnancy in 
second trimester Z34.02

 

 [place] [id number] [place] [address] 06248-
7052     

 

 [place] [id number] [place] [address]-
6332    [place] isoimmunization during pregnancy in second trimester, 
single or unspecified fetus O36.1920 and 20 weeks gestation of pregnancy Z3A.20

 

 [place] [id number] [place] [address] 75[address]-
6436  15 Mar, 2018  Normal pregnancy, first Z34.00 and 16 weeks gestation of 
pregnancy Z3A.16

 

 [place] [id number] [place] [address] 69185-
9014  15 2018  Normal pregnancy, first Z34.00 ; Sciatica of right side 
M54.31 ; 12 weeks gestation of pregnancy Z3A.12 and [place] isoimmunization during 
pregnancy in first trimester, single or unspecified fetus O36.1910

 

 [place] [id number] [place] [address]77-
6286     

 

 [place] [address] [place] [address] 61849-
7951    Normal pregnancy, first Z34.00 ; Anemia affecting pregnancy 
in first trimester O99.011 and 9 weeks gestation of pregnancy Z3A.09

 

 [place] [address] [place] [address]17-
7707     

 

 [place] [id number] [place] [address] 16308-
1608     

 

 [place] [id number] [place] [address]99-
6085  10 Derik, 2018  8 weeks gestation of pregnancy Z3A.08 ; Fever, unspecified 
R50.9 ; Missed menses N92.6 and Weight loss R63.4

 

 [place] [address], [place] [address]-
2541  27 Dec, 2017   

 

 [facility]  [address] [address]-
9422  14 Dec, 2017   

 

 [facility]  [address] [address]-
7571  08 Aug, 2017  Microcytic anemia D50.9 and Dysmenorrhea N94.6

 

 [facility]  [address] [address]-
1315  07 Aug, 2017   

 

 [facility]  [address] [address]-
5181  03 Aug, 2017   

 

 [facility]  [address] [address]-
6726    Dysmenorrhea N94.6

 

 [facility]  [address] [place] [address] 59068-
0076    Dysmenorrhea N94.6

 

 [facility]  [address], [place] [address]-
8735  12 Oct, 2010   

 

 [facility]  [address] [address]-
1404  12 Oct, 2010   

 

 [facility]  [address] [address]-
4774     

 

 [facility]  [address] [address]-
0430     

 

 [facility]  [id number], [place] [address]-
3304  13 Aug, 2007   

 

 [facility]  [address], [place] [address]-
9701     







IMMUNIZATIONS

No Known Immunizations



SOCIAL HISTORY

Never Assessed



REASON FOR VISIT

OB f/u-4 wk--tcuppettRN



PLAN OF CARE







 Activity  Details









  









 Follow Up  4 Weeks, 4 Weeks Reason:







VITAL SIGNS







 Height  64.25 in  2018-03-15

 

 Weight  108.6 lbs  2018-03-15

 

 Temperature  98.1 degrees Fahrenheit  2018-03-15

 

 Heart Rate  76 bpm  2018-03-15

 

 Respiratory Rate  16   2018-03-15

 

 BMI  18.496 kg/m2  2018-03-15

 

 Blood pressure systolic  108 mmHg  2018-03-15

 

 Blood pressure diastolic  70 mmHg  2018-03-15







MEDICATIONS







 Medication  Instructions  Dosage  Frequency  Start Date  End Date  Duration  
Status

 

 Ferrous Sulfate 325 (65 Fe) MG  Orally Once a day  1 tablet  24h  
     30 day(s)  Active

 

 Prenatal 28-0.8 MG  Orally daily  1  24h          Not-Taking

 

 Prenatal Vitamin 27-0.8 MG  Orally Once a day  as directed  24h  10 Derik, 2018 
    30 days  Active







RESULTS

No Results



PROCEDURES







 Procedure  Date Ordered  Result  Body Site

 

 URINE-NO MICRO  March 15, 2018      

 

 LAB NOT BILLED BY [facility]  March 15, 2018      

 

 CHEMILUMINESCENT ASSAY  March 15, 2018      







INSTRUCTIONS





MEDICATIONS ADMINISTERED

No Known Medications



MEDICAL (GENERAL) HISTORY







 Type  Description  Date

 

 Medical History  depression   

 

 Medical History  anxiety   

 

 Medical History  PTSD   

 

 Surgical History  part of spleen removed  

 

 Surgical History  corneal transplant  2016

 

 Hospitalization History  surgery   

 

 Hospitalization History  suicidal ([place])

## 2018-08-30 NOTE — LABOR PROGRESS NOTE
Labor Progress Note


Labor Progress Note


Date Seen by Provider:  Aug 30, 2018


Time Seen by Provider:  08:00


Subjective:


Pt just got epidural, is starting to feel better.





Objective:





Cervical exam: /-


Consistency: soft


Position: anterior


Presentation: vertex


Fetal heart tones: 133 beats per minute, moderate variability


Tocometer: 5 ctx/10 minutes


AROM done with meconium stained fluid noted





Assessment/Plan:


Sid Pereira  is a 22  /Para  1 / 0,Gestational Age (wks)42 here for 

IOL for post-dates. BP elevated.


Suspect BP elevation due to pain, but if not improved after epidural, will 

treat if above 160/110.


CEFM/TOCO


Continue pitocin


Anesthesia: epidural


Anticipate vaginal delivery.





Vitals - Labs


Vital Signs - I&O





Vital Signs








  Date Time  Temp Pulse Resp B/P (MAP) Pulse Ox O2 Delivery O2 Flow Rate FiO2


 


18 07:00  73 18 136/85 (102)  Room Air  


 


18 06:45  81 18 125/110 (115)  Room Air  


 


18 06:30  75 18 125/99 (108)  Room Air  


 


18 04:25 97.6 84 18 134/84 (101)  Room Air  


 


18 23:47 97.9 73 18 129/81 (97)  Room Air  


 


18 19:35 97.9 95 18 127/81 (96)  Room Air  














I & O 


 


 18





 07:00


 


Intake Total 2670 ml


 


Balance 2670 ml











Labs


Laboratory Tests


18 19:50: 


White Blood Count 8.5, Red Blood Count 4.59, Hemoglobin 12.5, Hematocrit 37, 

Mean Corpuscular Volume 80, Mean Corpuscular Hemoglobin 27, Mean Corpuscular 

Hemoglobin Concent 34, Red Cell Distribution Width 16.9H, Platelet Count 140, 

Mean Platelet Volume , Neutrophils (%) (Auto) 69, Lymphocytes (%) (Auto) 18, 

Monocytes (%) (Auto) 9, Eosinophils (%) (Auto) 4, Basophils (%) (Auto) 0, 

Neutrophils # (Auto) 5.9, Lymphocytes # (Auto) 1.6, Monocytes # (Auto) 0.7, 

Eosinophils # (Auto) 0.3, Basophils # (Auto) 0.0











TRESSA FULLER MD Aug 30, 2018 8:16 am

## 2018-08-30 NOTE — XMS REPORT
[facility]

 Created on: 2018



Sid Pereira

External Reference #: [id number]

: 1995

Sex: Female



Demographics







 Address  [address] [place]  [address]

 

 Preferred Language  Unknown

 

 Marital Status  Unknown

 

 Amish Affiliation  Unknown

 

 Race  Unknown

 

 Ethnic Group  Unknown





Author







 Author  TAE BLANCO

 

 Organization  [facility] [place]

 

 Address  [address] [place]  [address]



 

 Phone  [phone]







Care Team Providers







 Care Team Member Name  Role  Phone

 

 TAE BLANCO  Unavailable  [phone]







PROBLEMS







 Type  Condition  ICD9-CM Code  ITN37-RH Code  Onset Dates  Condition Status  
SNOMED Code

 

 Problem  Microcytic anemia     D50.9     Active  419253278

 

 Problem  Fever, unspecified     R50.9     Active  510383056

 

 Problem  Weight loss     R63.4     Active  57799073

 

 Problem  Dysmenorrhea     N94.6     Active  771650742

 

 Problem  Generalized anxiety disorder     F41.1     Active  29986268

 

 Problem  Sciatica of right side     M54.31     Active  52953916

 

 Problem  [place] isoimmunization during pregnancy in first trimester, single or 
unspecified fetus     O36.1910     Active  053416890

 

 Problem  Anemia affecting pregnancy in first trimester     O99.011     Active  
32312374

 

 Problem  Mild episode of recurrent major depressive disorder     F33.0     
Active  611687636

 

 Problem  Red blood cell antibody positive     R76.8     Active  597762981







ALLERGIES

No Information



ENCOUNTERS







 Encounter  Location  Date  Diagnosis

 

 [place] [address] [place] [address]-
3727     

 

 [place] [address] [place] [address] 9[address]30-
6762     

 

 [place] [address] [place] [address] 30209-
5130    Third trimester pregnancy Z34.93 and Encounter for 
immunization Z23

 

 [place] [address] [address]-
8666  15 May, 2018  [place] isoimmunization during pregnancy in first trimester, 
single or unspecified fetus O36.1910 ; Diabetes mellitus screening Z13.1 ; 25 
weeks gestation of pregnancy Z3A.25 and Prenatal care, first pregnancy in 
second trimester Z34.02

 

 [place] [address], [place] [address]-
9335     

 

 [place] [address] [place] [address]-
9262    [place] isoimmunization during pregnancy in second trimester, 
single or unspecified fetus O36.1920 and 20 weeks gestation of pregnancy Z3A.20

 

 [place] [address] [place] [address] 68092-
1358  15 Mar, 2018  Normal pregnancy, first Z34.00 and 16 weeks gestation of 
pregnancy Z3A.16

 

 [place] [address] [place] [address] 78931-
3775  15 2018  Normal pregnancy, first Z34.00 ; Sciatica of right side 
M54.31 ; 12 weeks gestation of pregnancy Z3A.12 and [place] isoimmunization during 
pregnancy in first trimester, single or unspecified fetus O36.1910

 

 [place] [address] [place] [address] 02657-
4043     

 

 [place] [address] [place] [address] 13764-
8924    Normal pregnancy, first Z34.00 ; Anemia affecting pregnancy 
in first trimester O99.011 and 9 weeks gestation of pregnancy Z3A.09

 

 [place] [address], [place] [address]-
4151     

 

 [place] [address] [place] [address] 28712-
1653     

 

 [place] [address] [place] [address] 30012-
2198  10 Derik, 2018  8 weeks gestation of pregnancy Z3A.08 ; Fever, unspecified 
R50.9 ; Missed menses N92.6 and Weight loss R63.4

 

 [place] [address], [place] [address]-
0516  27 Dec, 2017   

 

 [place] [id number] [place] [address] 8[address]-
0054  14 Dec, 2017   

 

 [facility] [place]  [id number] [address]-
1080  08 Aug, 2017  Microcytic anemia D50.9 and Dysmenorrhea N94.6

 

 [facility] [place]  [address], [place] [address]-
5926  07 Aug, 2017   

 

 [facility] [place]  [address], [place] [address]-
7634  03 Aug, 2017   

 

 [facility] [place]  [address], [place] [address]-
6421    Dysmenorrhea N94.6

 

 [facility] [place]  [id number] [place] [address] 69549-
2576    Dysmenorrhea N94.6

 

 [facility] [place]  [address], [place] [address]-
4461  12 Oct, 2010   

 

 [facility] [place]  [address], [place] [address]-
7001  12 Oct, 2010   

 

 [facility] [place]  [address], [place] [address]-
8441     

 

 [facility] [place]  [address], [place] [address]-
9179     

 

 [facility] [place]  [address], [place] [address]-
3013  13 Aug, 2007   

 

 [facility] [place]  [address], [place] [address]-
4803     







IMMUNIZATIONS

No Known Immunizations



SOCIAL HISTORY

Never Assessed



REASON FOR VISIT

Waiting for call back



PLAN OF CARE





VITAL SIGNS





MEDICATIONS

Unknown Medications



RESULTS

No Results



PROCEDURES

No Known procedures



INSTRUCTIONS





MEDICATIONS ADMINISTERED

No Known Medications



MEDICAL (GENERAL) HISTORY







 Type  Description  Date

 

 Medical History  depression   

 

 Medical History  anxiety   

 

 Medical History  PTSD   

 

 Surgical History  part of spleen removed  

 

 Surgical History  corneal transplant  2016

 

 Hospitalization History  surgery   

 

 Hospitalization History  suicidal ([place])

## 2018-08-30 NOTE — XMS REPORT
[facility]

 Created on: 2018



Randall Shanticornelia

External Reference #: [id number]

: 1995

Sex: Female



Demographics







 Address  [address] [place]  [address]

 

 Preferred Language  Unknown

 

 Marital Status  Unknown

 

 Restoration Affiliation  Unknown

 

 Race  Unknown

 

 Ethnic Group  Unknown





Author







 Author  MARJ VANESSA

 

 [facility]

 

 Address  [address] [place]  [address]



 

 Phone  [phone]







Care Team Providers







 Care Team Member Name  Role  Phone

 

 MARJ VANESSA  Unavailable  [phone]







PROBLEMS







 Type  Condition  ICD9-CM Code  QQU87-AQ Code  Onset Dates  Condition Status  
SNOMED Code

 

 Problem  Microcytic anemia     D50.9     Active  944335638

 

 Problem  Fever, unspecified     R50.9     Active  647083404

 

 Problem  Weight loss     R63.4     Active  19191973

 

 Problem  Dysmenorrhea     N94.6     Active  047896590

 

 Problem  Generalized anxiety disorder     F41.1     Active  00256687

 

 Problem  Sciatica of right side     M54.31     Active  87247607

 

 Problem  [place] isoimmunization during pregnancy in first trimester, single or 
unspecified fetus     O36.1910     Active  098256911

 

 Problem  Anemia affecting pregnancy in first trimester     O99.011     Active  
31375399

 

 Problem  Mild episode of recurrent major depressive disorder     F33.0     
Active  677414871

 

 Problem  Red blood cell antibody positive     R76.8     Active  388212112







ALLERGIES







 Substance  Reaction  Event Type  Date  Status

 

 Oxycodone HCl  nausea  Drug Allergy  10 Derik, 2018  Active







ENCOUNTERS







 Encounter  Location  Date  Diagnosis

 

 [place] [id number], [place] [address]-
0658     

 

 [place] [address] [place] [address]-
0139     

 

 [place] [address] [place] [id number] [place] [address]54-
8477    Third trimester pregnancy Z34.93 and Encounter for 
immunization Z23

 

 [place] [address] [place] [address] 93[address]-
2794  15 May, 2018  [place] isoimmunization during pregnancy in first trimester, 
single or unspecified fetus O36.1910 ; Diabetes mellitus screening Z13.1 ; 25 
weeks gestation of pregnancy Z3A.25 and Prenatal care, first pregnancy in 
second trimester Z34.02

 

 [place] [address], [place] [address]-
2726     

 

 [place] [id number] [place] [address]-
7604    [place] isoimmunization during pregnancy in second trimester, 
single or unspecified fetus O36.1920 and 20 weeks gestation of pregnancy Z3A.20

 

 [place] [id number] [place] [address]73-
8102  15 Mar, 2018  Normal pregnancy, first Z34.00 and 16 weeks gestation of 
pregnancy Z3A.16

 

 [place] [id number] [place] [address] 83271-
8527  15 2018  Normal pregnancy, first Z34.00 ; Sciatica of right side 
M54.31 ; 12 weeks gestation of pregnancy Z3A.12 and [place] isoimmunization during 
pregnancy in first trimester, single or unspecified fetus O36.1910

 

 [place] [address] [place] [address] 20[address]-
8051     

 

 [place] [address] [place] [address]20-
0753    Normal pregnancy, first Z34.00 ; Anemia affecting pregnancy 
in first trimester O99.011 and 9 weeks gestation of pregnancy Z3A.09

 

 [place] [id number] [place] [address] 16717-
8162     

 

 [place] [address] [place] [address] 30228-
7858     

 

 [place] [id number] [place] [address] 87816-
1098  10 Derik, 2018  8 weeks gestation of pregnancy Z3A.08 ; Fever, unspecified 
R50.9 ; Missed menses N92.6 and Weight loss R63.4

 

 [place] [id number] [place] [address] 06[address]-
2549  27 Dec, 2017   

 

 [place] [address], [place] [address]-
5998  14 Dec, 2017   

 

 [facility]  [address], [place] [address]-
4436  08 Aug, 2017  Microcytic anemia D50.9 and Dysmenorrhea N94.6

 

 [facility]  [address], [place] [address]-
1346  07 Aug, 2017   

 

 [facility]  [address], [place] [address]-
9619  03 Aug, 2017   

 

 [facility]  [address], [place] [address]-
4859    Dysmenorrhea N94.6

 

 [facility]  [address], [place] [address]-
8889    Dysmenorrhea N94.6

 

 [facility]  [address] [place] [address]-
1710  12 Oct, 2010   

 

 [facility]  [address], [place] [address]-
5163  12 Oct, 2010   

 

 [facility]  [address], [place] [address]-
7658     

 

 [facility]  [address], [place] [address]-
7801     

 

 [facility]  [address], [place] [address]-
7722  13 Aug, 2007   

 

 [facility]  [id number], [place] [address]-
0940     







IMMUNIZATIONS

No Known Immunizations



SOCIAL HISTORY

Never Assessed



REASON FOR VISIT

Establish Care- concerns about weight loss, reports that she weighed 120lbs 
when she moved her around a year ago, reports a good appetite-AHarrymanRN, 
Pregnancy symptoms, tender breast, nausea daily with vomiting, missed last 
months period



PLAN OF CARE







 Activity  Details









  









 Follow Up  gyn referral Reason:







VITAL SIGNS







 Height  64.25 in  2018-01-10

 

 Weight  97.5 lbs  2018-01-10

 

 Temperature  99.9 degrees Fahrenheit  2018-01-10

 

 Heart Rate  88 bpm  2018-01-10

 

 Respiratory Rate  18   2018-01-10

 

 BMI  16.60 kg/m2  2018-01-10

 

 Blood pressure systolic  102 mmHg  2018-01-10

 

 Blood pressure diastolic  64 mmHg  2018-01-10







MEDICATIONS







 Medication  Instructions  Dosage  Frequency  Start Date  End Date  Duration  
Status

 

 Prenatal Vitamin 27-0.8 MG  Orally Once a day  as directed  24h  10 Derik, 2018 
    30 days  Active







RESULTS

No Results



PROCEDURES







 Procedure  Date Ordered  Result  Body Site

 

 INFLUENZA ASSAY W/OPTIC  Derik 10, 2018      

 

 URINE PREGNANCY TEST  Derik 10, 2018      

 

 VENIPUNCT, ROUTINE*  Derik 10, 2018      

 

 COMPLETE CBC W/AUTO DIFF WBC  Derik 10, 2018      

 

 ASSAY THYROID STIM HORMONE  Derik 10, 2018      

 

 COMPREHEN METABOLIC PANEL  Derik 10, 2018      







INSTRUCTIONS





MEDICATIONS ADMINISTERED

No Known Medications



MEDICAL (GENERAL) HISTORY







 Type  Description  Date

 

 Medical History  depression   

 

 Medical History  anxiety   

 

 Medical History  PTSD   

 

 Surgical History  part of spleen removed  

 

 Surgical History  corneal transplant  2016

 

 Hospitalization History  surgery   

 

 Hospitalization History  suicidal ([place])

## 2018-08-30 NOTE — XMS REPORT
[facility]

 Created on: 2018



Shanti Pereiracornelia

External Reference #: [id number]

: 1995

Sex: Female



Demographics







 Address  [address] [place]  [address]

 

 Preferred Language  Unknown

 

 Marital Status  Unknown

 

 Holiness Affiliation  Unknown

 

 Race  Unknown

 

 Ethnic Group  Unknown





Author







 Author  VONNIE BLUE

 

 [facility]

 

 Address  [address] [place]  [address]



 

 Phone  [phone]







Care Team Providers







 Care Team Member Name  Role  Phone

 

 SUHARPREETELE  Unavailable  [phone]







PROBLEMS







 Type  Condition  ICD9-CM Code  RFD77-DH Code  Onset Dates  Condition Status  
SNOMED Code

 

 Problem  Microcytic anemia     D50.9     Active  541723136

 

 Problem  Fever, unspecified     R50.9     Active  272302672

 

 Problem  Weight loss     R63.4     Active  12126887

 

 Problem  Dysmenorrhea     N94.6     Active  445901801

 

 Problem  Generalized anxiety disorder     F41.1     Active  61269606

 

 Problem  Sciatica of right side     M54.31     Active  22131166

 

 Problem  [place] isoimmunization during pregnancy in first trimester, single or 
unspecified fetus     O36.1910     Active  777767839

 

 Problem  Anemia affecting pregnancy in first trimester     O99.011     Active  
08328481

 

 Problem  Mild episode of recurrent major depressive disorder     F33.0     
Active  836205948

 

 Problem  Red blood cell antibody positive     R76.8     Active  069159483







ALLERGIES

No Information



ENCOUNTERS







 Encounter  Location  Date  Diagnosis

 

 [place] [address] [place] [address]-
7097     

 

 [place] [address] [place] [address]-
6334  15 Mar, 2018  Normal pregnancy, first Z34.00 and 16 weeks gestation of 
pregnancy Z3A.16

 

 [place] [address] [place] [address] 16873-
2968  15 2018  Normal pregnancy, first Z34.00 ; Sciatica of right side 
M54.31 ; 12 weeks gestation of pregnancy Z3A.12 and [place] isoimmunization during 
pregnancy in first trimester, single or unspecified fetus O36.1910

 

 [place] [address] [place] [address] 65[address]-
9584     

 

 [facility]  [address], [place] [address]  17 2018  Normal pregnancy, first Z34.00 ; Anemia affecting pregnancy 
in first trimester O99.011 and 9 weeks gestation of pregnancy Z3A.09

 

 [facility]  [address], [place] [address]
5[address]  16 2018   

 

 [facility]  [address] [place] [id number] [place] [address]-
9920  16 2018   

 

 [facility]  [id number], [place] [address]  10 2018  8 weeks gestation of pregnancy Z3A.08 ; Fever, unspecified 
R50.9 ; Missed menses N92.6 and Weight loss R63.4

 

 [facility]  [address] [address]
6759  27 Dec, 2017   

 

 [facility]  [id number] [place] [address]-
3334  14 Dec, 2017   

 

 [facility]  [address] [place] [id number] [place] [address] 0[address]65-
8449  08 Aug, 2017  Microcytic anemia D50.9 and Dysmenorrhea N94.6

 

 [facility]  [address], [place] [address]-
6906  07 Aug, 2017   

 

 [facility]  [address] [address]-
1057  03 Aug, 2017   

 

 [facility]  [address] [address]-
1697    Dysmenorrhea N94.6

 

 [facility]  [id number], [place] [address]-
1919    Dysmenorrhea N94.6

 

 [facility]  [address], [place] [address]-
4495  12 Oct, 2010   

 

 [facility]  [address] [place] [id number] [place] [address] [id number]-
2284  12 Oct, 2010   

 

 [facility]  [address] [facility] [id number] [place] [address]-
8426     

 

 [facility]  [address] [place] [id number] [address]
2546     

 

 [facility]  [address] [facility] [id number] [address]-
3907  13 Aug, 2007   

 

 [facility]  [address] [id number] [address]-
0889     







IMMUNIZATIONS

No Known Immunizations



SOCIAL HISTORY

Never Assessed



REASON FOR VISIT

Requests return call



PLAN OF CARE





VITAL SIGNS





MEDICATIONS

Unknown Medications



RESULTS

No Results



PROCEDURES

No Known procedures



INSTRUCTIONS





MEDICATIONS ADMINISTERED

No Known Medications



MEDICAL (GENERAL) HISTORY







 Type  Description  Date

 

 Medical History  depression   

 

 Medical History  anxiety   

 

 Medical History  PTSD   

 

 Surgical History  part of spleen removed  

 

 Surgical History  corneal transplant  2016

 

 Hospitalization History  surgery   

 

 Hospitalization History  [place] ([place]  [place]

## 2018-08-30 NOTE — OB LABOR & DELIVERY RECORD
Vag Delivery Note


Vag Delivery Note


Date of Delivery: 18 





Preoperative Diagnosis: Sid Pereira  is a 22  /Para  1 / 0,

Gestational Age (wks)40with 2 days





Postoperative Diagnosis: Same


Surgeon: TRESSA FULLER 





Anesthesia: epidural





Delivery Type: Spontaneous vaginal delivery





Findings: 


Viable female infant, apgars 8/9, weight 7#8


Lacerations: bilateral inner labial, right perineal first degree


Intact placenta with 3 vessel cord. No nuchal cord, body cord or shoulder 

dystocia


Cytotec 800 mcg placed for postpartum hemorrhage prophylaxis





Estimated Blood Loss: 400 ml





Complications: None





Condition: Stable





Description of Procedure:





The patient is a G1 now P1 who presented for IOL for postdates. She was 

admitted and informed consent was obtained. Her labor course was unremarkable. 

She progressed to complete dilatation and began to push. 





She was then set up for delivery. The infant's head was delivered 

atraumatically in the NALINI position. The shoulders and remainder of the infant's 

body were then delivered without difficulty. Upon delivery, the infant was 

vigorous and placed on maternal abdomen. The cord was doubly clamped and cut 

and the infant was handed off to the pediatric staff. An intact placenta with 3-

vessel cord delivered via Lopez and there was found to be moderate bleeding.~ 

Vigorous fundal massage was performed and the fundus was found to be firm. IV 

oxytocin was given. Examination of the vagina and perineum revealed bilateral 

inner labial lacerations repaired in the simple running fashion with 3-0 rapide 

suture, and right perineal first degree laceration repaired in simple running 

fashion with 3-0 rapide. Left inner labial laceration noted to continue to 

bleed from suture locations, reinforced with additional simple interrupted 3-0 

rapide with good hemostasis. Following the repair, sponge, instrument and 

needle counts were correct. Mom and baby were both in stable condition in the 

labor suite.





Vitals - Labs


Vital Signs - I&O





Vital Signs








  Date Time  Temp Pulse Resp B/P (MAP) Pulse Ox O2 Delivery O2 Flow Rate FiO2


 


18 07:00  73 18 136/85 (102)  Room Air  


 


18 06:45  81 18 125/110 (115)  Room Air  


 


18 06:30  75 18 125/99 (108)  Room Air  


 


18 04:25 97.6 84 18 134/84 (101)  Room Air  


 


18 23:47 97.9 73 18 129/81 (97)  Room Air  


 


18 19:35 97.9 95 18 127/81 (96)  Room Air  














I & O 


 


 18





 07:00


 


Intake Total 2670 ml


 


Balance 2670 ml











Labs


Laboratory Tests


18 19:50: 


White Blood Count 8.5, Red Blood Count 4.59, Hemoglobin 12.5, Hematocrit 37, 

Mean Corpuscular Volume 80, Mean Corpuscular Hemoglobin 27, Mean Corpuscular 

Hemoglobin Concent 34, Red Cell Distribution Width 16.9H, Platelet Count 140, 

Mean Platelet Volume , Neutrophils (%) (Auto) 69, Lymphocytes (%) (Auto) 18, 

Monocytes (%) (Auto) 9, Eosinophils (%) (Auto) 4, Basophils (%) (Auto) 0, 

Neutrophils # (Auto) 5.9, Lymphocytes # (Auto) 1.6, Monocytes # (Auto) 0.7, 

Eosinophils # (Auto) 0.3, Basophils # (Auto) 0.0











TRESSA FULLER MD Aug 30, 2018 1:27 pm

## 2018-08-30 NOTE — XMS REPORT
[facility]

 Created on: 2018



Shanti Pereiracornelia

External Reference #: [id number]

: 1995

Sex: Female



Demographics







 Address  [address] [place]  [address]

 

 Preferred Language  Unknown

 

 Marital Status  Unknown

 

 Bahai Affiliation  Unknown

 

 Race  Unknown

 

 Ethnic Group  Unknown





Author







 Author  VONNIE BLUE

 

 Organization  [facility]

 

 Address  [address] [place]  [address]



 

 Phone  [phone]







Care Team Providers







 Care Team Member Name  Role  Phone

 

 [address] WELLSELE  Unavailable  [phone]







PROBLEMS







 Type  Condition  ICD9-CM Code  CIV63-GO Code  Onset Dates  Condition Status  
SNOMED Code

 

 Problem  Microcytic anemia     D50.9     Active  870637677

 

 Problem  Fever, unspecified     R50.9     Active  269523257

 

 Problem  Weight loss     R63.4     Active  06931011

 

 Problem  Dysmenorrhea     N94.6     Active  991227667

 

 Problem  Generalized anxiety disorder     F41.1     Active  68810494

 

 Problem  Sciatica of right side     M54.31     Active  20402208

 

 Problem  [place] isoimmunization during pregnancy in first trimester, single or 
unspecified fetus     O36.1910     Active  102415712

 

 Problem  Anemia affecting pregnancy in first trimester     O99.011     Active  
39899645

 

 Problem  Mild episode of recurrent major depressive disorder     F33.0     
Active  635408797

 

 Problem  Red blood cell antibody positive     R76.8     Active  474553198







ALLERGIES

No Known Allergies



ENCOUNTERS







 Encounter  Location  Date  Diagnosis

 

 [place] [address] [place] [address] 0[address]59-
4312     

 

 [place] [address] [place] [address] 02934-
3053  15 Mar, 2018  Normal pregnancy, first Z34.00 and 16 weeks gestation of 
pregnancy Z3A.16

 

 [place] [address] [place] [address] 93002-
7592  15 2018  Normal pregnancy, first Z34.00 ; Sciatica of right side 
M54.31 ; 12 weeks gestation of pregnancy Z3A.12 and [place] isoimmunization during 
pregnancy in first trimester, single or unspecified fetus O36.1910

 

 [place] [address] [place] [address] 09551-
8560     

 

 [facility]  [address], [place] [address]
6363    Normal pregnancy, first Z34.00 ; Anemia affecting pregnancy 
in first trimester O99.011 and 9 weeks gestation of pregnancy Z3A.09

 

 [facility]  [address], [place] [address]-
8992  16 2018   

 

 [facility]  [address] [place] [id number] [place] [address] 6[address]-
4678  16 2018   

 

 [facility]  [address] [place] [id number] [place] [address] 13201-
7571  10 2018  8 weeks gestation of pregnancy Z3A.08 ; Fever, unspecified 
R50.9 ; Missed menses N92.6 and Weight loss R63.4

 

 [place] [address] [place] [id number] [address]-
3095  27 Dec, 2017   

 

 [facility]  [address] [place] [id number] [place] [address] 09979-
7794  14 Dec, 2017   

 

 [facility]  [address] [place] [id number] [place] [address] 82734-
7859  08 Aug, 2017  Microcytic anemia D50.9 and Dysmenorrhea N94.6

 

 [facility]  [address], [place] [address]-
0149  07 Aug, 2017   

 

 [facility]  [address], [place] [address]-
1609  03 Aug, 2017   

 

 [facility]  [address], [place] [address]-
8109    Dysmenorrhea N94.6

 

 [facility]  [id number] [place] [address]-
6283    Dysmenorrhea N94.6

 

 [facility]  [address], [place] [address]-
3056  12 Oct, 2010   

 

 [facility]  [address] [place] [id number] [place] [address] 58256-
5284  12 Oct, 2010   

 

 [facility]  [address] [facility] [id number] [place] [address]-
5926     

 

 [facility]  [address] [facility] [id number] [address]
2546     

 

 [facility]  [address] [facility] [id number] [address]-
2546  13 Aug, 2007   

 

 [facility]  [address] [facility] [id number] [address]-
0376     







IMMUNIZATIONS

No Known Immunizations



SOCIAL HISTORY

Never Assessed



REASON FOR VISIT

Heavy Periods, pt. is stating she is having cramps, running fever, and feels 
like she cant move since she started period., pt. states symptoms have 
progressviely gotten worse---[place],[facility]



PLAN OF CARE







 Activity  Details









  









 Follow Up  4 Weeks Reason:needs to est care







VITAL SIGNS







 Height  64.25 in  2017

 

 Weight  111.2 lbs  2017

 

 Temperature  98.1 degrees Fahrenheit  2017

 

 Heart Rate  64 bpm  2017

 

 Respiratory Rate  16   2017

 

 BMI  18.94 kg/m2  2017

 

 Blood pressure systolic  116 mmHg  2017

 

 Blood pressure diastolic  80 mmHg  2017







MEDICATIONS







 Medication  Instructions  Dosage  Frequency  Start Date  End Date  Duration  
Status

 

 Prozac 20 MG  Orally Once a day  1 capsule in the morning  24h           Active

 

 Ibuprofen 800 MG  Orally Three times a day  1 tablet with food or milk  8h  06 
Jul, 2017  5 Aug, 2017  30 day(s)  Active







RESULTS







 Name  Result  Date  Reference Range

 

 THYROID ANALYZER     2017   

 

 TSH  0.395     0.450-4.500

 

 T4,Free (Direct)  1.11     0.82-1.77

 

 Triiodothyronine, Free, Serum  3.5     2.0-4.4

 

 Interpretive Comment         

 

 A1C     2017   

 

 Hemoglobin A1c  5.4     4.8-5.6

 

 CBC     2017   

 

 WBC  3.2     3.4-10.8

 

 RBC  4.88     3.77-5.28

 

 Hemoglobin  9.3     11.1-15.9

 

 Hematocrit  32.5     34.0-46.6

 

 MCV  67     79-97

 

 MCH  19.1     26.6-33.0

 

 MCHC  28.6     31.5-35.7

 

 RDW  16.7     12.3-15.4

 

 Platelets  177     150-379

 

 Neutrophils  42      

 

 Lymphs  44      

 

 Monocytes  7      

 

 Eos  6      

 

 Basos  1      

 

 Neutrophils (Absolute)  1.4     1.4-7.0

 

 Lymphs (Absolute)  1.4     0.7-3.1

 

 Monocytes(Absolute)  0.2     0.1-0.9

 

 Eos (Absolute)  0.2     0.0-0.4

 

 Baso (Absolute)  0.0     0.0-0.2

 

 Immature Granulocytes  0      

 

 Immature Grans (Abs)  0.0     0.0-0.1

 

 LIPID PANEL     2017   

 

 Cholesterol, Total  110     100-169

 

 Triglycerides  38     0-89

 

 HDL Cholesterol  62     >39

 

 VLDL Cholesterol Juan  8     5-40

 

 LDL Cholesterol Calc  40     0-109

 

 CMP     2017   

 

 Glucose, Serum  89     65-99

 

 BUN  13     6-20

 

 Creatinine, Serum  0.65     0.57-1.00

 

 eGFR If NonAfricn Am  129         >59

 

 eGFR If Africn Am  149         >59

 

 BUN/Creatinine Ratio  20     9-23

 

 Sodium, Serum  139     134-144

 

 Potassium, Serum  4.1     3.5-5.2

 

 Chloride, Serum  103     

 

 Carbon Dioxide, Total  19     18-29

 

 Calcium, Serum  8.7     8.7-10.2

 

 Protein, Total, Serum  6.9     6.0-8.5

 

 Albumin, Serum  4.0     3.5-5.5

 

 Globulin, Total  2.9     1.5-4.5

 

 A/G Ratio  1.4     1.2-2.2

 

 Bilirubin, Total  0.5     0.0-1.2

 

 Alkaline Phosphatase, S  44     

 

 AST (SGOT)  13     0-40

 

 ALT (SGPT)  4     0-32







PROCEDURES







 Procedure  Date Ordered  Result  Body Site

 

 ASSAY THYROID STIM HORMONE  2017      

 

 COMPLETE CBC W/AUTO DIFF WBC  2017      

 

 VENIPUNCT, ROUTINE*  2017      

 

 LIPID PANEL  2017      

 

 COMPREHEN METABOLIC PANEL  2017      

 

 GLYCATED HEMOGLOBIN TEST  2017      







INSTRUCTIONS





MEDICATIONS ADMINISTERED

No Known Medications



MEDICAL (GENERAL) HISTORY







 Type  Description  Date

 

 Medical History  depression   

 

 Medical History  anxiety   

 

 Medical History  PTSD   

 

 Surgical History  part of spleen removed  

 

 Surgical History  corneal transplant  2016

 

 Hospitalization History  surgery   

 

 Hospitalization History  [place]icidal ([place])

## 2018-08-30 NOTE — XMS REPORT
[facility]

 Created on: 2018



Sid Pereira

External Reference #: [id number]

: 1995

Sex: Female



Demographics







 Address  [address] [place]  [address]

 

 Preferred Language  Unknown

 

 Marital Status  Unknown

 

 Nondenominational Affiliation  Unknown

 

 Race  Unknown

 

 Ethnic Group  Unknown





Author







 Author  VONNIE BLUE

 

 Organization  [facility]

 

 Address  [address] [place]  [address]



 

 Phone  [phone]







Care Team Providers







 Care Team Member Name  Role  Phone

 

 BLUEHARPRE[address] WELLSELE  Unavailable  [phone]







PROBLEMS







 Type  Condition  ICD9-CM Code  HFO38-[place] Code  Onset Dates  Condition Status  
SNOMED Code

 

 Problem  Microcytic anemia     D50.9     Active  344219029

 

 Problem  Fever, unspecified     R50.9     Active  692596821

 

 Problem  Weight loss     R63.4     Active  03738296

 

 Problem  Dysmenorrhea     N94.6     Active  493973412

 

 Problem  Generalized anxiety disorder     F41.1     Active  77014997

 

 Problem  Sciatica of right side     M54.31     Active  36006018

 

 Problem  [place] isoimmunization during pregnancy in first trimester, single or 
unspecified fetus     O36.1910     Active  641730836

 

 Problem  Anemia affecting pregnancy in first trimester     O99.011     Active  
04432559

 

 Problem  Mild episode of recurrent major depressive disorder     F33.0     
Active  419174854

 

 Problem  Red blood cell antibody positive     R76.8     Active  437902866







ALLERGIES







 Substance  Reaction  Event Type  Date  Status

 

 Oxycodone HCl  nausea  Drug Allergy  07 Aug, 2017  Active







ENCOUNTERS







 Encounter  Location  Date  Diagnosis

 

 [place] [address] [place] [id number] [place] [address]-
2140     

 

 [facility]  [address] [place] [address]15-
1856  15 Mar, 2018  Normal pregnancy, first Z34.00 and 16 weeks gestation of 
pregnancy Z3A.16

 

 [place] [address] [place] [id number] [place] [address] 26629-
4859  15 Feb, 2018  Normal pregnancy, first Z34.00 ; Sciatica of right side 
M54.31 ; 12 weeks gestation of pregnancy Z3A.12 and [place] isoimmunization during 
pregnancy in first trimester, single or unspecified fetus O36.1910

 

 [facility]  [address] [place] [id number] [place] [address]-
3221     

 

 [facility]  [address], [place] [address]-
5545    Normal pregnancy, first Z34.00 ; Anemia affecting pregnancy 
in first trimester O99.011 and 9 weeks gestation of pregnancy Z3A.09

 

 [facility]  [address] [address]-
8461  16 2018   

 

 [facility]  [address] [place] [id number] [place] [address] 25645-
3014  16 2018   

 

 [facility]  [address] [place] [address] 18806-
7296  10 2018  8 weeks gestation of pregnancy Z3A.08 ; Fever, unspecified 
R50.9 ; Missed menses N92.6 and Weight loss R63.4

 

 [facility]  [id number] [place] [address] 86276-
7895  27 Dec, 2017   

 

 [facility]  [id number] [address]-
4068  14 Dec, 2017   

 

 [facility]  [id number] [place] [address] 7[address]15-
8510  08 Aug, 2017  Microcytic anemia D50.9 and Dysmenorrhea N94.6

 

 [facility]  [address] [address]-
9015  07 Aug, 2017   

 

 [facility]  [address] [address]-
1783  03 Aug, 2017   

 

 [facility]  [address] [address]-
8453    Dysmenorrhea N94.6

 

 [facility]  [id number] [place] [address] 42021-
5512    Dysmenorrhea N94.6

 

 [facility]  [address] [address]-
2027  12 Oct, 2010   

 

 [facility]  [address] [facility] [id number] [address]-
2546  12 Oct, 2010   

 

 [facility]  [address] [place] [id number] [address]
2546     

 

 [facility]  [address] [place] [id number] [address]-
2546     

 

 [facility]  [address] [place] [id number] [address]-
2546  13 Aug, 2007   

 

 [facility]  [address] [facility] [id number] [address]-
7516     







IMMUNIZATIONS

No Known Immunizations



SOCIAL HISTORY

Never Assessed



REASON FOR VISIT

[facility] updated.  Princess HENSON



PLAN OF CARE





VITAL SIGNS





MEDICATIONS







 Medication  Instructions  Dosage  Frequency  Start Date  End Date  Duration  
Status

 

 Prozac 20 MG  Orally Once a day  1 capsule in the morning  24h           Active







RESULTS

No Results



PROCEDURES

No Known procedures



INSTRUCTIONS





MEDICATIONS ADMINISTERED

No Known Medications



MEDICAL (GENERAL) HISTORY







 Type  Description  Date

 

 Medical History  depression   

 

 Medical History  anxiety   

 

 Medical History  PTSD   

 

 Surgical History  part of spleen removed  

 

 Surgical History  corneal transplant  2016

 

 Hospitalization History  surgery   

 

 Hospitalization History  [place] ([place]

## 2018-08-30 NOTE — XMS REPORT
[facility]

 Created on: 2018



Sid Pereira

External Reference #: [id number]

: 1995

Sex: Female



Demographics







 Address  [address] [place]  [address]

 

 Preferred Language  Unknown

 

 Marital Status  Unknown

 

 Yazidi Affiliation  Unknown

 

 Race  Unknown

 

 Ethnic Group  Unknown





Author







 Author  JONNY  TRESSA

 

 [facility]

 

 Address  [address] [place]  [address]



 

 Phone  [phone]







Care Team Providers







 Care Team Member Name  Role  Phone

 

 BETZY FULLERY  Unavailable  [phone]







PROBLEMS







 Type  Condition  ICD9-CM Code  SZU11-CF Code  Onset Dates  Condition Status  
SNOMED Code

 

 Problem  Microcytic anemia     D50.9     Active  517026653

 

 Problem  Fever, unspecified     R50.9     Active  043551831

 

 Problem  Weight loss     R63.4     Active  14281135

 

 Problem  Positive GBS test     B95.1     Active  9142067006923

 

 Problem  Dysmenorrhea     N94.6     Active  714841971

 

 Problem  Generalized anxiety disorder     F41.1     Active  10101817

 

 Problem  Sciatica of right side     M54.31     Active  86612091

 

 Problem  [place] isoimmunization during pregnancy in first trimester, single or 
unspecified fetus     O36.1910     Active  164158507

 

 Problem  Anemia affecting pregnancy in first trimester     O99.011     Active  
10436624

 

 Problem  Mild episode of recurrent major depressive disorder     F33.0     
Active  443973682

 

 Problem  Red blood cell antibody positive     R76.8     Active  221913713







ALLERGIES

No Information



ENCOUNTERS







 Encounter  Location  Date  Diagnosis

 

 [place] [address] [address]
1188  27 Aug, 2018   

 

 [place] [address] [place] [address]-
0929  16 Aug, 2018   

 

 [place] [address] [place] [address]-
3517  08 Aug, 2018  Third trimester pregnancy Z34.93 and 37 weeks gestation of 
pregnancy Z3A.37

 

 [place] [address] [place] [address]-
8235    Third trimester pregnancy Z34.93 ; Fundal height low for 
dates in third trimester O26.843 and 34 weeks gestation of pregnancy Z3A.34

 

 [place] [address] [place] [address]-
2759    32 weeks gestation of pregnancy Z3A.32 and Prenatal care, 
first pregnancy in third trimester Z34.03

 

 [place] [address] [place] [address] [place] [address]-
1709    Third trimester pregnancy Z34.93 ; Encounter for 
immunization Z23 and 30 weeks gestation of pregnancy Z3A.30

 

 [place] [address] [place] [address] [place] [address]-
3748  15 May, 2018  [place] isoimmunization during pregnancy in first trimester, 
single or unspecified fetus O36.1910 ; Diabetes mellitus screening Z13.1 ; 25 
weeks gestation of pregnancy Z3A.25 and Prenatal care, first pregnancy in 
second trimester Z34.02

 

 [place] [address] [place] [address] [place] [address] 6[address]-
4849     

 

 [place] [address] [place] [address] [place] [address]-
6545    Janelle isoimmunization during pregnancy in second trimester, 
single or unspecified fetus O36.1920 and 20 weeks gestation of pregnancy Z3A.20

 

 [place] [address] [place] [address] [place] [address]-
0703  15 Mar, 2018  Normal pregnancy, first Z34.00 and 16 weeks gestation of 
pregnancy Z3A.16

 

 [place] [address] [place] [address] [place] [address]-
2700  15 2018  Normal pregnancy, first Z34.00 ; Sciatica of right side 
M54.31 ; 12 weeks gestation of pregnancy Z3A.12 and Janelle isoimmunization during 
pregnancy in first trimester, single or unspecified fetus O36.1910

 

 [place] [address] [place] [address] [place] [address] 85[address]-
9833     

 

 [place] [address] [place] [address] [place] [address] 48061-
1096    Normal pregnancy, first Z34.00 ; Anemia affecting pregnancy 
in first trimester O99.011 and 9 weeks gestation of pregnancy Z3A.09

 

 [facility]  [address] [address]-
9900  16 2018   

 

 [facility]  [address] [address] [place] [address] 8[address]26-
6203  16 2018   

 

 [facility]  [address] [address]-
8500  10 2018  8 weeks gestation of pregnancy Z3A.08 ; Fever, unspecified 
R50.9 ; Missed menses N92.6 and Weight loss R63.4

 

 [facility]  [address] [address]-
4837  27 Dec, 2017   

 

 [facility]  [address] [place] [address] [address] [place] [address]990-
7625  14 Dec, 2017   

 

 [facility]  [address] [place] [address] [address]-
3892  08 Aug, 2017  Microcytic anemia D50.9 and Dysmenorrhea N94.6

 

 [facility]  [address] [address]-
0710  07 Aug, 2017   

 

 [facility]  [address] [address] [place] [address]-
7523  03 Aug, 2017   

 

 [facility]  [address] [address]-
3892    Dysmenorrhea N94.6

 

 [facility]  [address] [address]-
0282    Dysmenorrhea N94.6

 

 [facility]  [address] [address]-
5005  12 Oct, 2010   

 

 [facility]  [address] [address]-
2050  12 Oct, 2010   

 

 [facility]  [address] [address]-
5834     

 

 [facility]  [address] [place] [id number] [address]-
6296     

 

 [facility]  [address] [facility] [id number] [address]-
4095  13 Aug, 2007   

 

 [facility]  [address] [facility] [id number] [place] [address]-
6786     







IMMUNIZATIONS

No Known Immunizations



SOCIAL HISTORY

Never Assessed



REASON FOR VISIT

OB f/u 4 wk -- amanda umanzor



PLAN OF CARE







 Activity  Details









  









 Follow Up  4 Weeks, 4 Weeks, 4 Weeks Reason:

 

 Pending Test  UA OB DIP (IN HOUSE) 



 



VITAL SIGNS







 Height  64.25 in  2018-05-15

 

 Weight  120.9 lbs  2018-05-15

 

 Temperature  98.0 degrees Fahrenheit  2018-05-15

 

 BMI  20.591 kg/m2  2018-05-15

 

 Blood pressure systolic  116 mmHg  2018-05-15

 

 Blood pressure diastolic  70 mmHg  2018-05-15







MEDICATIONS







 Medication  Instructions  Dosage  Frequency  Start Date  End Date  Duration  
Status

 

 Prenatal 28-0.8 MG  Orally daily  1  24h          Not-Taking

 

 Ferrous Sulfate 325 (65 Fe) MG  Orally Once a day  1 tablet  24h  
     30 day(s)  Active

 

 Prenatal Vitamin 27-0.8 MG  Orally Once a day  as directed  24h  10 Derik, 2018 
    30 days  Active







RESULTS

No Results



PROCEDURES







 Procedure  Date Ordered  Result  Body Site

 

 LAB NOT BILLED BY [facility]  May 15, 2018      

 

 URINE-NO MICRO  May 15, 2018      







INSTRUCTIONS





MEDICATIONS ADMINISTERED

No Known Medications



MEDICAL (GENERAL) HISTORY







 Type  Description  Date

 

 Medical History  depression   

 

 Medical History  anxiety   

 

 Medical History  PTSD   

 

 Surgical History  part of spleen removed  

 

 Surgical History  corneal transplant  2016

 

 Hospitalization History  surgery   

 

 Hospitalization History  [place] ([place])

## 2018-08-30 NOTE — XMS REPORT
[facility]

 Created on: 2018



Rnadall Shanticornelia

External Reference #: [id number]

: 1995

Sex: Female



Demographics







 Address  [address] [place]  [address]

 

 Preferred Language  Unknown

 

 Marital Status  Unknown

 

 Church Affiliation  Unknown

 

 Race  Unknown

 

 Ethnic Group  Unknown





Author







 Author  MARJ VANESSA

 

 [facility]

 

 Address  [address] [place]  [address]



 

 Phone  [phone]







Care Team Providers







 Care Team Member Name  Role  Phone

 

 MARJ VANESSA  Unavailable  [phone]







PROBLEMS







 Type  Condition  ICD9-CM Code  NMQ38-LX Code  Onset Dates  Condition Status  
SNOMED Code

 

 Problem  Microcytic anemia     D50.9     Active  795776401

 

 Problem  Fever, unspecified     R50.9     Active  771587274

 

 Problem  Weight loss     R63.4     Active  71951208

 

 Problem  Dysmenorrhea     N94.6     Active  517853300

 

 Problem  Generalized anxiety disorder     F41.1     Active  09543476

 

 Problem  Sciatica of right side     M54.31     Active  57762860

 

 Problem  [place] isoimmunization during pregnancy in first trimester, single or 
unspecified fetus     O36.1910     Active  928915558

 

 Problem  Anemia affecting pregnancy in first trimester     O99.011     Active  
04211663

 

 Problem  Mild episode of recurrent major depressive disorder     F33.0     
Active  023719810

 

 Problem  Red blood cell antibody positive     R76.8     Active  756297268







ALLERGIES

No Information



ENCOUNTERS







 Encounter  Location  Date  Diagnosis

 

 [place] [address] [place] [address]-
4245     

 

 [place] [address] [place] [address] [place] [address]-
2380  15 May, 2018  Janelle isoimmunization during pregnancy in first trimester, 
single or unspecified fetus O36.1910 ; Diabetes mellitus screening Z13.1 ; 25 
weeks gestation of pregnancy Z3A.25 and Prenatal care, first pregnancy in 
second trimester Z34.02

 

 [place] [address] [place] [address]-
2291     

 

 [place] [address] [place] [id number] [place] [address] 91646-
4489    [place] isoimmunization during pregnancy in second trimester, 
single or unspecified fetus O36.1920 and 20 weeks gestation of pregnancy Z3A.20

 

 [place] [address] [place] [address] [place] [address]65-
5620  15 Mar, 2018  Normal pregnancy, first Z34.00 and 16 weeks gestation of 
pregnancy Z3A.16

 

 [place] [address] [place] [address] [place] [address] 91843-
4490  15 2018  Normal pregnancy, first Z34.00 ; Sciatica of right side 
M54.31 ; 12 weeks gestation of pregnancy Z3A.12 and [place] isoimmunization during 
pregnancy in first trimester, single or unspecified fetus O36.1910

 

 [place] [address] [place] [address] [place] [address] 76143-
0467     

 

 [place] [address] [place] [address] [place] [address] 85145-
5710    Normal pregnancy, first Z34.00 ; Anemia affecting pregnancy 
in first trimester O99.011 and 9 weeks gestation of pregnancy Z3A.09

 

 [place] [address] [place] [address] [place] [address] 1965[address]-
8316     

 

 [place] [address] [place] [address] [place] [address] 95785-
0320     

 

 [place] [address] [place] [address] [place] [address] 17442-
1159  10 Derik, 2018  8 weeks gestation of pregnancy Z3A.08 ; Fever, unspecified 
R50.9 ; Missed menses N92.6 and Weight loss R63.4

 

 [place] [address] [place] [address] 60425-
1675  27 Dec, 2017   

 

 [place] [address] [place] [address] [place] [address] 34071-
6276  14 Dec, 2017   

 

 [place] [address] [place] [address] [place] [address] 81876-
4823  08 Aug, 2017  Microcytic anemia D50.9 and Dysmenorrhea N94.6

 

 [place] [address] [place] [address] [place] [address] 67819-
1120  07 Aug, 2017   

 

 [facility]  [address] [address]-
6680  03 Aug, 2017   

 

 [facility]  [address] [address]-
6238    Dysmenorrhea N94.6

 

 [facility]  [address] [address]-
1142    Dysmenorrhea N94.6

 

 [facility]  [address] [address]-
8726  12 Oct, 2010   

 

 [facility]  [address] [place] [id number] [place] [address] [phone]-
4917  12 Oct, 2010   

 

 [facility]  [address] [address]-
3715     

 

 [facility]  [address] [address]-
1043     

 

 [facility]  [address] [address]-
4200  13 Aug, 2007   

 

 [facility]  [address] [address]-
9726     







IMMUNIZATIONS

No Known Immunizations



SOCIAL HISTORY

Never Assessed



REASON FOR VISIT

Requests return call



PLAN OF CARE





VITAL SIGNS





MEDICATIONS

Unknown Medications



RESULTS

No Results



PROCEDURES

No Known procedures



INSTRUCTIONS





MEDICATIONS ADMINISTERED

No Known Medications



MEDICAL (GENERAL) HISTORY







 Type  Description  Date

 

 Medical History  depression   

 

 Medical History  anxiety   

 

 Medical History  PTSD   

 

 Surgical History  part of spleen removed  

 

 Surgical History  corneal transplant  2016

 

 Hospitalization History  surgery   

 

 Hospitalization History  [place] ([place])

## 2018-08-30 NOTE — XMS REPORT
[facility]

 Created on: 2018



Shanti Pereiracornelia

External Reference #: [id number]

: 1995

Sex: Female



Demographics







 Address  [address] [place]  [address]

 

 Preferred Language  Unknown

 

 Marital Status  Unknown

 

 Adventist Affiliation  Unknown

 

 Race  Unknown

 

 Ethnic Group  Unknown





Author







 Author  TRESSA FULLER

 

 [facility]

 

 Address  [address] [place]  [address]



 

 Phone  [phone]







Care Team Providers







 Care Team Member Name  Role  Phone

 

 TRESSA FULLER  Unavailable  [phone]







PROBLEMS







 Type  Condition  ICD9-CM Code  TFX04-HH Code  Onset Dates  Condition Status  
SNOMED Code

 

 Problem  Microcytic anemia     D50.9     Active  424256732

 

 Problem  Fever, unspecified     R50.9     Active  309833227

 

 Problem  Weight loss     R63.4     Active  24405904

 

 Problem  Dysmenorrhea     N94.6     Active  085466522

 

 Problem  Generalized anxiety disorder     F41.1     Active  90295892

 

 Problem  Sciatica of right side     M54.31     Active  71471781

 

 Problem  [place] isoimmunization during pregnancy in first trimester, single or 
unspecified fetus     O36.1910     Active  690549293

 

 Problem  Anemia affecting pregnancy in first trimester     O99.011     Active  
70221385

 

 Problem  Mild episode of recurrent major depressive disorder     F33.0     
Active  930435445

 

 Problem  Red blood cell antibody positive     R76.8     Active  934893782







ALLERGIES







 Substance  Reaction  Event Type  Date  Status

 

 Oxycodone HCl  nausea  Drug Allergy    Active







ENCOUNTERS







 Encounter  Location  Date  Diagnosis

 

 [place] [address] [place] [id number] [address]-
7356     

 

 [place] [address] [place] [address] 18914-
4676     

 

 [place] [address] [place] [id number] [address]-
5265    Third trimester pregnancy Z34.93 and Encounter for 
immunization Z23

 

 [place] [address] [place] [address] 41368-
7698  15 May, 2018  [place]elle isoimmunization during pregnancy in first trimester, 
single or unspecified fetus O36.1910 ; Diabetes mellitus screening Z13.1 ; 25 
weeks gestation of pregnancy Z3A.25 and Prenatal care, first pregnancy in 
second trimester Z34.02

 

 [place] [address] [address]-
9198     

 

 [place] [address] [place] [address] [place] [address] 08828-
1654    Janelle isoimmunization during pregnancy in second trimester, 
single or unspecified fetus O36.1920 and 20 weeks gestation of pregnancy Z3A.20

 

 [place] [address] [place] [address] [place] [address] 31044-
0906  15 Mar, 2018  Normal pregnancy, first Z34.00 and 16 weeks gestation of 
pregnancy Z3A.16

 

 [place] [address] [place] [address] [place] [address] 87669-
4535  15 2018  Normal pregnancy, first Z34.00 ; Sciatica of right side 
M54.31 ; 12 weeks gestation of pregnancy Z3A.12 and Janelle isoimmunization during 
pregnancy in first trimester, single or unspecified fetus O36.1910

 

 [place] [address] [place] [address] 14553-
5496     

 

 [place] [address] [place] [address] [place] [address] 51760-
4895    Normal pregnancy, first Z34.00 ; Anemia affecting pregnancy 
in first trimester O99.011 and 9 weeks gestation of pregnancy Z3A.09

 

 [place] [address] [place] [address] 47584-
0991     

 

 [place] [address] [place] [address] [place] [address] 11635-
8858     

 

 [place] [address] [place] [address] [place] [address] 73760-
4411  10 Derik, 2018  8 weeks gestation of pregnancy Z3A.08 ; Fever, unspecified 
R50.9 ; Missed menses N92.6 and Weight loss R63.4

 

 [place] [address] [place] [address] [place] [address] 86460-
5533  27 Dec, 2017   

 

 [place] [address] [address]-
1891  14 Dec, 2017   

 

 [facility]  [address] [place] [address] 21605-
6334  08 Aug, 2017  Microcytic anemia D50.9 and Dysmenorrhea N94.6

 

 [facility]  [address] [place] [address] 01779-
3680  07 Aug, 2017   

 

 [facility]  [address] [place] [address] 95179-
9669  03 Aug, 2017   

 

 [facility]  [address] [place] [address] 45996-
0407    Dysmenorrhea N94.6

 

 [facility]  [address] [place] [address] [place] [address] 90572-
6557    Dysmenorrhea N94.6

 

 [facility]  [address] [place] [address] [place] [address] 87849-
4911  12 Oct, 2010   

 

 [facility]  [address] [place] [address] 89561-
6780  12 Oct, 2010   

 

 [facility]  [address] [place] [address] [place] [address] 36205-
0592     

 

 [facility]  [address] [address]-
4414     

 

 [facility]  [address] [address]-
3321  13 Aug, 2007   

 

 [facility]  [address] [address]-
3446     







IMMUNIZATIONS

No Known Immunizations



SOCIAL HISTORY

Never Assessed



REASON FOR VISIT

OB Flowsheet



PLAN OF CARE





VITAL SIGNS





MEDICATIONS







 Medication  Instructions  Dosage  Frequency  Start Date  End Date  Duration  
Status

 

 Prenatal Vitamin 27-0.8 MG  Orally Once a day  as directed  24h  10 Derik, 2018 
    30 days  Not-Taking







RESULTS

No Results



PROCEDURES

No Known procedures



INSTRUCTIONS





MEDICATIONS ADMINISTERED

No Known Medications



MEDICAL (GENERAL) HISTORY







 Type  Description  Date

 

 Medical History  depression   

 

 Medical History  anxiety   

 

 Medical History  PTSD   

 

 Surgical History  part of spleen removed  

 

 Surgical History  corneal transplant  2016

 

 Hospitalization History  surgery   

 

 Hospitalization History  [place] ([place])  2015

## 2018-08-30 NOTE — PHYSICIAN QUERY-FINAL DX
NATALIO EDWARDS 8/30/18 2054:


Clinic Account Progress/Dx


Physician Query:


Please give diagnosis





Please provide diagnosis and weeks of gestation


Date of Service





Aug 28, 2018 at 11:55





TRESSA FULLER MD 8/31/18 1635:


Clinic Account Progress/Dx


DIAGNOSIS:


Diagnosis


40 weeks gestation


Contractions without active labor











NATALIO EDWARDS Aug 30, 2018 20:54


TRESSA FULLER MD Aug 31, 2018 16:35

## 2018-08-30 NOTE — XMS REPORT
[facility]

 Created on: 2018



Sid Pereira

External Reference #: [id number]

: 1995

Sex: Female



Demographics







 Address  [address] [place]  [address]

 

 Preferred Language  Unknown

 

 Marital Status  Unknown

 

 Moravian Affiliation  Unknown

 

 Race  Unknown

 

 Ethnic Group  Unknown





Author







 Author  JONNY  TRESSA

 

 [facility]

 

 Address  [address] [place]  [address]



 

 Phone  [phone]







Care Team Providers







 Care Team Member Name  Role  Phone

 

 BETZY FULLERY  Unavailable  [phone]







PROBLEMS







 Type  Condition  ICD9-CM Code  OVQ65-AF Code  Onset Dates  Condition Status  
SNOMED Code

 

 Problem  Microcytic anemia     D50.9     Active  761931597

 

 Problem  Fever, unspecified     R50.9     Active  795638664

 

 Problem  Weight loss     R63.4     Active  31235311

 

 Problem  Dysmenorrhea     N94.6     Active  627108881

 

 Problem  Generalized anxiety disorder     F41.1     Active  99921782

 

 Problem  Sciatica of right side     M54.31     Active  36587108

 

 Problem  [place] isoimmunization during pregnancy in first trimester, single or 
unspecified fetus     O36.1910     Active  658941924

 

 Problem  Anemia affecting pregnancy in first trimester     O99.011     Active  
61417677

 

 Problem  Mild episode of recurrent major depressive disorder     F33.0     
Active  192587745

 

 Problem  Red blood cell antibody positive     R76.8     Active  039256237







ALLERGIES

No Information



ENCOUNTERS







 Encounter  Location  Date  Diagnosis

 

 [place] [address] [place] [address]33-
3711  27 Aug, 2018   

 

 [place] [address] [place] [address] 11872-
1694  16 Aug, 2018   

 

 [place] [address] [place] [id number] [place] [address] 45198-
9077  07 Aug, 2018   

 

 [place] [address] [place] [id number] [place] [address] 57197-
3759    Third trimester pregnancy Z34.93 ; Fundal height low for 
dates in third trimester O26.843 and 34 weeks gestation of pregnancy Z3A.34

 

 [place] [address] [place] [address] 00130-
0611    32 weeks gestation of pregnancy Z3A.32 and Prenatal care, 
first pregnancy in third trimester Z34.03

 

 [place] [address] [place] [id number] [place] [address] 11868-
4470    Third trimester pregnancy Z34.93 ; Encounter for 
immunization Z23 and 30 weeks gestation of pregnancy Z3A.30

 

 [place] [address] [place] [id number] [place] [address] 12491-
9841  15 May, 2018  [place] isoimmunization during pregnancy in first trimester, 
single or unspecified fetus O36.1910 ; Diabetes mellitus screening Z13.1 ; 25 
weeks gestation of pregnancy Z3A.25 and Prenatal care, first pregnancy in 
second trimester Z34.02

 

 [place] [address] [place] [id number] [place] [address] 19987-
5484     

 

 [place] [address] [place] [id number] [place] [address] 68450-
9636    [place] isoimmunization during pregnancy in second trimester, 
single or unspecified fetus O36.1920 and 20 weeks gestation of pregnancy Z3A.20

 

 [place] [address] [place] [id number] [place] [address] 19661-
5508  15 Mar, 2018  Normal pregnancy, first Z34.00 and 16 weeks gestation of 
pregnancy Z3A.16

 

 [place] [address] [place] [address] 21550-
5322  15 2018  Normal pregnancy, first Z34.00 ; Sciatica of right side 
M54.31 ; 12 weeks gestation of pregnancy Z3A.12 and [place] isoimmunization during 
pregnancy in first trimester, single or unspecified fetus O36.1910

 

 [place] [address] [place] [id number] [address]-
0039     

 

 [place] [address] [place] [id number] [place] [address] 04479-
6769    Normal pregnancy, first Z34.00 ; Anemia affecting pregnancy 
in first trimester O99.011 and 9 weeks gestation of pregnancy Z3A.09

 

 [place] [address] [place] [id number] [place] [address] 42940-
6070     

 

 [facility]  [address] [address]-
0788     

 

 [facility]  [id number] [address]-
8050  10 Derik, 2018  8 weeks gestation of pregnancy Z3A.08 ; Fever, unspecified 
R50.9 ; Missed menses N92.6 and Weight loss R63.4

 

 [facility]  [id number] [address]-
5034  27 Dec, 2017   

 

 [facility]  [address] [place] [address] [id number] [place] [address] 539773-
4641  14 Dec, 2017   

 

 [facility]  [address] [place] [id number] [place] [address] 957689-
9539  08 Aug, 2017  Microcytic anemia D50.9 and Dysmenorrhea N94.6

 

 [facility]  [address] [place] [id number] [place] [address] 25930-
5413  07 Aug, 2017   

 

 [facility]  [address] [address]-
6670  03 Aug, 2017   

 

 [facility]  [address] [address]-
2666    Dysmenorrhea N94.6

 

 [facility]  [address] [address]-
1048    Dysmenorrhea N94.6

 

 [facility]  [address] [address]-
3985  12 Oct, 2010   

 

 [facility]  [address] [address]-
1526  12 Oct, 2010   

 

 [facility]  [address] [address]-
3106     

 

 [facility]  [address] [address]-
3136     

 

 [facility]  [address] [place] [id number] [address]-
7513  13 Aug, 2007   

 

 [facility]  [address] [facility] [id number] [address]-
0693     







IMMUNIZATIONS

No Known Immunizations



SOCIAL HISTORY

Never Assessed



REASON FOR VISIT

vomiting



PLAN OF CARE





VITAL SIGNS





MEDICATIONS

Unknown Medications



RESULTS

No Results



PROCEDURES

No Known procedures



INSTRUCTIONS





MEDICATIONS ADMINISTERED

No Known Medications



MEDICAL (GENERAL) HISTORY







 Type  Description  Date

 

 Medical History  depression   

 

 Medical History  anxiety   

 

 Medical History  PTSD   

 

 Surgical History  part of spleen removed  

 

 Surgical History  corneal transplant  2016

 

 Hospitalization History  surgery   

 

 Hospitalization History  [place] ([place]  [place]

## 2018-08-30 NOTE — XMS REPORT
[facility]

 Created on: 2018



Sid Pereira

External Reference #: [id number]

: 1995

Sex: Female



Demographics







 Address  [address] [place]  [address]

 

 Preferred Language  Unknown

 

 Marital Status  Unknown

 

 Scientology Affiliation  Unknown

 

 Race  Unknown

 

 Ethnic Group  Unknown





Author







 Author  STEFANI DUNCAN

 

 [facility]

 

 Address  [address] [place]  [address]



 

 Phone  [phone]







Care Team Providers







 Care Team Member Name  Role  Phone

 

 STEFANI DUNCAN  Unavailable  [phone]







PROBLEMS







 Type  Condition  ICD9-CM Code  YJZ02-HU Code  Onset Dates  Condition Status  
SNOMED Code

 

 Problem  Microcytic anemia     D50.9     Active  454060286

 

 Problem  Fever, unspecified     R50.9     Active  936008716

 

 Problem  Weight loss     R63.4     Active  19047219

 

 Problem  Dysmenorrhea     N94.6     Active  648252982

 

 Problem  Generalized anxiety disorder     F41.1     Active  63585030

 

 Problem  Sciatica of right side     M54.31     Active  76745743

 

 Problem  [place] isoimmunization during pregnancy in first trimester, single or 
unspecified fetus     O36.1910     Active  692926815

 

 Problem  Anemia affecting pregnancy in first trimester     O99.011     Active  
15373845

 

 Problem  Mild episode of recurrent major depressive disorder     F33.0     
Active  028075424

 

 Problem  Red blood cell antibody positive     R76.8     Active  813218154







ALLERGIES

No Information



ENCOUNTERS







 Encounter  Location  Date  Diagnosis

 

 [place] [address] [place] [address] 79979-
3824     

 

 [place] [address] [place] [id number] [place] [address] 40099-
2603  15 May, 2018  [place] isoimmunization during pregnancy in first trimester, 
single or unspecified fetus O36.1910 ; Diabetes mellitus screening Z13.1 ; 25 
weeks gestation of pregnancy Z3A.25 and Prenatal care, first pregnancy in 
second trimester Z34.02

 

 [place] [address] [place] [id number] [place] [address] 30371-
5978     

 

 [place] [address] [place] [id number] [place] [address] 81103-
8385    Janelle isoimmunization during pregnancy in second trimester, 
single or unspecified fetus O36.1920 and 20 weeks gestation of pregnancy Z3A.20

 

 [place] [address] [address]-
2770  15 Mar, 2018  Normal pregnancy, first Z34.00 and 16 weeks gestation of 
pregnancy Z3A.16

 

 [place] [address] [place] [id number] [place] [address] 65647-
5532  15 2018  Normal pregnancy, first Z34.00 ; Sciatica of right side 
M54.31 ; 12 weeks gestation of pregnancy Z3A.12 and [place] isoimmunization during 
pregnancy in first trimester, single or unspecified fetus O36.1910

 

 [place] [address] [place] [id number] [place] [address] 59711-
2785     

 

 [place] [address] [place] [id number] [place] [address] 68147-
8106  17 2018  Normal pregnancy, first Z34.00 ; Anemia affecting pregnancy 
in first trimester O99.011 and 9 weeks gestation of pregnancy Z3A.09

 

 [place] [address] [place] [id number] [place] [address] 45592-
7504  16 2018   

 

 [place] [address] [place] [id number] [place] [address] 14934-
4371     

 

 [place] [address] [place] [id number] [place] [address] 86800-
8646  10 Derik, 2018  8 weeks gestation of pregnancy Z3A.08 ; Fever, unspecified 
R50.9 ; Missed menses N92.6 and Weight loss R63.4

 

 [place] [address] [place] [id number] [address]-
2929  27 Dec, 2017   

 

 [place] [address] [place] [id number] [place] [address] 22848-
2663  14 Dec, 2017   

 

 [place] [address] [place] [id number] [place] [address] 99659-
3334  08 Aug, 2017  Microcytic anemia D50.9 and Dysmenorrhea N94.6

 

 [place] [address] [place] [id number] [place] [address] 32847-
5618  07 Aug, 2017   

 

 [facility]  [address] [address]-
1306  03 Aug, 2017   

 

 [facility]  [address] [address]-
0386    Dysmenorrhea N94.6

 

 [facility]  [address] [address]-
5306    Dysmenorrhea N94.6

 

 [facility]  [address], [place] [address]-
3726  12 Oct, 2010   

 

 [facility]  [address] [address]-
7049  12 Oct, 2010   

 

 [facility]  [address] [address]-
1035     

 

 [facility]  [address] [address]-
9940     

 

 [facility]  [address] [address]-
6367  13 Aug, 2007   

 

 [facility]  [address] [place] [id number] [address]-
2680     







IMMUNIZATIONS

No Known Immunizations



SOCIAL HISTORY

Never Assessed



REASON FOR VISIT

[place]



PLAN OF CARE





VITAL SIGNS





MEDICATIONS

Unknown Medications



RESULTS

No Results



PROCEDURES

No Known procedures



INSTRUCTIONS





MEDICATIONS ADMINISTERED

No Known Medications



MEDICAL (GENERAL) HISTORY







 Type  Description  Date

 

 Medical History  depression   

 

 Medical History  anxiety   

 

 Medical History  PTSD   

 

 Surgical History  part of spleen removed  

 

 Surgical History  corneal transplant  2016

 

 Hospitalization History  surgery   

 

 Hospitalization History  [place] ([place])

## 2018-08-30 NOTE — XMS REPORT
[facility]

 Created on: 07/10/2018



Sid Pereira

External Reference #: [id number]

: 1995

Sex: Female



Demographics







 Address  [address] [place]  [address]

 

 Preferred Language  Unknown

 

 Marital Status  Unknown

 

 Jewish Affiliation  Unknown

 

 Race  Unknown

 

 Ethnic Group  Unknown





Author







 Author  JONNY  TRESSA

 

 [facility]

 

 Address  [address] [place]  [address]



 

 Phone  [phone]







Care Team Providers







 Care Team Member Name  Role  Phone

 

 TRESSA FULLER  Unavailable  [phone]







PROBLEMS







 Type  Condition  ICD9-CM Code  FAV14-GV Code  Onset Dates  Condition Status  
SNOMED Code

 

 Problem  Microcytic anemia     D50.9     Active  065077760

 

 Problem  Fever, unspecified     R50.9     Active  907591796

 

 Problem  Weight loss     R63.4     Active  64586086

 

 Problem  Dysmenorrhea     N94.6     Active  977286273

 

 Problem  Generalized anxiety disorder     F41.1     Active  17809145

 

 Problem  Sciatica of right side     M54.31     Active  78184120

 

 Problem  [place] isoimmunization during pregnancy in first trimester, single or 
unspecified fetus     O36.1910     Active  810908235

 

 Problem  Anemia affecting pregnancy in first trimester     O99.011     Active  
86547774

 

 Problem  Mild episode of recurrent major depressive disorder     F33.0     
Active  937432261

 

 Problem  Red blood cell antibody positive     R76.8     Active  424409866







ALLERGIES







 Substance  Reaction  Event Type  Date  Status

 

 Oxycodone HCl  nausea  Drug Allergy  15 Feb, 2018  Active







ENCOUNTERS







 Encounter  Location  Date  Diagnosis

 

 [place] [address] [place] [id number] [place] [address] 08565-
1985     

 

 [place] [address] [place] [id number] [place] [address] 85057-
9462    32 weeks gestation of pregnancy Z3A.32 and Prenatal care, 
first pregnancy in third trimester Z34.03

 

 [place] [address] [place] [id number] [place] [address] 83821-
4384    Third trimester pregnancy Z34.93 ; Encounter for 
immunization Z23 and 30 weeks gestation of pregnancy Z3A.30

 

 [place] [address] [place] [address] 17515-
7049  15 May, 2018  [place] isoimmunization during pregnancy in first trimester, 
single or unspecified fetus O36.1910 ; Diabetes mellitus screening Z13.1 ; 25 
weeks gestation of pregnancy Z3A.25 and Prenatal care, first pregnancy in 
second trimester Z34.02

 

 [place] [address] [place] [id number] [place] [address] 30720-
8799     

 

 [place] [address] [place] [address] [place] [address] 25681-
1684    [place] isoimmunization during pregnancy in second trimester, 
single or unspecified fetus O36.1920 and 20 weeks gestation of pregnancy Z3A.20

 

 [place] [address] [place] [address] [place] [address] 59114-
9470  15 Mar, 2018  Normal pregnancy, first Z34.00 and 16 weeks gestation of 
pregnancy Z3A.16

 

 [place] [address] [place] [id number] [place] [address] 05783-
1071  15 2018  Normal pregnancy, first Z34.00 ; Sciatica of right side 
M54.31 ; 12 weeks gestation of pregnancy Z3A.12 and [place] isoimmunization during 
pregnancy in first trimester, single or unspecified fetus O36.1910

 

 [place] [address] [place] [id number] [place] [address] 02476-
1012     

 

 [place] [address] [place] [address] 67148-
8003    Normal pregnancy, first Z34.00 ; Anemia affecting pregnancy 
in first trimester O99.011 and 9 weeks gestation of pregnancy Z3A.09

 

 [place] [address] [place] [address] 73050-
3874     

 

 [place] [address] [place] [id number] [place] [address] 93985-
0479     

 

 [place] [address] [place] [id number] [place] [address] 34558-
1036  10 Derik, 2018  8 weeks gestation of pregnancy Z3A.08 ; Fever, unspecified 
R50.9 ; Missed menses N92.6 and Weight loss R63.4

 

 [place] [address], [place] [address]-
6596  27 Dec, 2017   

 

 [facility]  [address], [place] [address]-
4756  14 Dec, 2017   

 

 [facility]  [address], [place] [address]-
7524  08 Aug, 2017  Microcytic anemia D50.9 and Dysmenorrhea N94.6

 

 [facility]  [address], [place] [address]-
1072  07 Aug, 2017   

 

 [facility]  [address] [place] [address] 94035-
6625  03 Aug, 2017   

 

 [facility]  [address] [place] [address] 29291-
2938    Dysmenorrhea N94.6

 

 [facility]  [address] [place] [id number] [place] [address] 59911-
2075    Dysmenorrhea N94.6

 

 [facility]  [address], [place] [address]-
7591  12 Oct, 2010   

 

 [facility]  [address], [place] [address]-
2488  12 Oct, 2010   

 

 [facility]  [address] [address]-
4348     

 

 [facility]  [address], [place] [address]-
2290     

 

 [facility]  [address], [place] [address]-
2994  13 Aug, 2007   

 

 [facility]  [address], [place] [address]-
8741     







IMMUNIZATIONS

No Known Immunizations



SOCIAL HISTORY

Never Assessed



REASON FOR VISIT

OB f/u-4 wk---tcuppettRN



PLAN OF CARE







 Activity  Details









  









 Follow Up  4 Weeks, 4 Weeks Reason:







VITAL SIGNS







 Height  64.25 in  2018-02-15

 

 Weight  107.0 lbs  2018-02-15

 

 Temperature  98.0 degrees Fahrenheit  2018-02-15

 

 Heart Rate  72 bpm  2018-02-15

 

 Respiratory Rate  16   2018-02-15

 

 BMI  18.224 kg/m2  2018-02-15

 

 Blood pressure systolic  116 mmHg  2018-02-15

 

 Blood pressure diastolic  70 mmHg  2018-02-15







MEDICATIONS







 Medication  Instructions  Dosage  Frequency  Start Date  End Date  Duration  
Status

 

 Prenatal 28-0.8 MG  Orally daily  1  24h          Active

 

 Prenatal Vitamin 27-0.8 MG  Orally Once a day  as directed  24h  10 Derik, 2018 
    30 days  Not-Taking

 

 Ferrous Sulfate 325 (65 Fe) MG  Orally Once a day  1 tablet  24h  
     30 day(s)  Not-Taking







RESULTS







 Name  Result  Date  Reference Range

 

 UA OB DIP (IN HOUSE)     2018-02-15   

 

 Glucose  negative      

 

 Protein  negative      







PROCEDURES







 Procedure  Date Ordered  Result  Body Site

 

 URINE-NO MICRO  Feb 15, 2018      







INSTRUCTIONS





MEDICATIONS ADMINISTERED

No Known Medications



MEDICAL (GENERAL) HISTORY







 Type  Description  Date

 

 Medical History  depression   

 

 Medical History  anxiety   

 

 Medical History  PTSD   

 

 Surgical History  part of spleen removed  

 

 Surgical History  corneal transplant  2016

 

 Hospitalization History  surgery   

 

 [place]ization History  [place]

## 2018-08-30 NOTE — XMS REPORT
[facility]

 Created on: 2018



Sid Pereira

External Reference #: [id number]

: 1995

Sex: Female



Demographics







 Address  [address] [place]  [address]

 

 Preferred Language  Unknown

 

 Marital Status  Unknown

 

 Advent Affiliation  Unknown

 

 Race  Unknown

 

 Ethnic Group  Unknown





Author







 Author  JONNY  TRESSA

 

 [facility]

 

 Address  [address] [place]  [address]



 

 Phone  [phone]







Care Team Providers







 Care Team Member Name  Role  Phone

 

 TRESSA FULLER  Unavailable  [phone]







PROBLEMS







 Type  Condition  ICD9-CM Code  FFB54-JC Code  Onset Dates  Condition Status  
SNOMED Code

 

 Problem  Microcytic anemia     D50.9     Active  660068382

 

 Problem  Fever, unspecified     R50.9     Active  071008481

 

 Problem  Weight loss     R63.4     Active  68055942

 

 Problem  Dysmenorrhea     N94.6     Active  757171713

 

 Problem  Generalized anxiety disorder     F41.1     Active  63011148

 

 Problem  Sciatica of right side     M54.31     Active  65162491

 

 Problem  [place] isoimmunization during pregnancy in first trimester, single or 
unspecified fetus     O36.1910     Active  692239637

 

 Problem  Anemia affecting pregnancy in first trimester     O99.011     Active  
39873834

 

 Problem  Mild episode of recurrent major depressive disorder     F33.0     
Active  252575402

 

 Problem  Red blood cell antibody positive     R76.8     Active  849340118







ALLERGIES







 Substance  Reaction  Event Type  Date  Status

 

 Oxycodone HCl  nausea  Drug Allergy    Active







ENCOUNTERS







 Encounter  Location  Date  Diagnosis

 

 [facility]  [address] [place] [phone]
[phone]  27 Aug, 2018   

 

 [facility]  [address] [place] [phone]-
[phone]  16 Aug, 2018   

 

 [facility]  [id number] [place] [address]-
5852  07 Aug, 2018   

 

 [facility]  [id number] [place] [address]-
5027    Third trimester pregnancy Z34.93 ; Fundal height low for 
dates in third trimester O26.843 and 34 weeks gestation of pregnancy Z3A.34

 

 [facility]  [id number] [place] [address]-
1467    32 weeks gestation of pregnancy Z3A.32 and Prenatal care, 
first pregnancy in third trimester Z34.03

 

 [place] [id number] [place] [address]-
8277  19 2018  Third trimester pregnancy Z34.93 ; Encounter for 
immunization Z23 and 30 weeks gestation of pregnancy Z3A.30

 

 [place] [address] [phone]
2181  15 May, 2018  [place] isoimmunization during pregnancy in first trimester, 
single or unspecified fetus O36.1910 ; Diabetes mellitus screening Z13.1 ; 25 
weeks gestation of pregnancy Z3A.25 and Prenatal care, first pregnancy in 
second trimester Z34.02

 

 [place] [id number] [place] [address]-
[phone]     

 

 [place] [id number] [place] [address]-
4588    [place] isoimmunization during pregnancy in second trimester, 
single or unspecified fetus O36.1920 and 20 weeks gestation of pregnancy Z3A.20

 

 [place] [id number] [place] [address]-
6395  15 Mar, 2018  Normal pregnancy, first Z34.00 and 16 weeks gestation of 
pregnancy Z3A.16

 

 [place] [id number] [place] [address]-
5817  15 2018  Normal pregnancy, first Z34.00 ; Sciatica of right side 
M54.31 ; 12 weeks gestation of pregnancy Z3A.12 and Janelle isoimmunization during 
pregnancy in first trimester, single or unspecified fetus O36.1910

 

 [place] [id number] [place] [address]-
[phone]     

 

 [place] [address] [address]
8135    Normal pregnancy, first Z34.00 ; Anemia affecting pregnancy 
in first trimester O99.011 and 9 weeks gestation of pregnancy Z3A.09

 

 [place] [address] [address]-
[address]  16 2018   

 

 [facility]  [address] [address]
9323     

 

 [facility]  [address] [address]
2165  10 Derik, 2018  8 weeks gestation of pregnancy Z3A.08 ; Fever, unspecified 
R50.9 ; Missed menses N92.6 and Weight loss R63.4

 

 [facility]  [address] [address]98  27 Dec, 2017   

 

 [facility]  [id number] [place] [phone]
[phone]  14 Dec, 2017   

 

 [facility]  [address] [address]
4989  08 Aug, 2017  Microcytic anemia D50.9 and Dysmenorrhea N94.6

 

 [facility]  [address] [address]-
2857  07 Aug, 2017   

 

 [facility]  [address] [address]
4861  03 Aug, 2017   

 

 [facility]  [address] [address]-
[address]8    Dysmenorrhea N94.6

 

 [facility]  [address] [address]-
2748    Dysmenorrhea N94.6

 

 [facility]  [address] [address]-
9092  12 Oct, 2010   

 

 [facility]  [address] [address]-
3904  12 Oct, 2010   

 

 [facility]  [address] [address]-
7[address]     

 

 [facility]  [address] [address]-
1139     

 

 [facility]  [address] [facility] [id number] [place] [address]-
4485  13 Aug, 2007   

 

 [facility]  [address] [facility] [id number] [place] [address]
6546     







IMMUNIZATIONS

No Known Immunizations



SOCIAL HISTORY

Never Assessed



REASON FOR VISIT

OB f/u-4 wk -- amanda umanzor



PLAN OF CARE







 Activity  Details









  









 Follow Up  4 Weeks, 4 Weeks, 4 Weeks Reason:







VITAL SIGNS







 Height  64.25 in  2018

 

 Weight  113.0 lbs  2018

 

 Temperature  98.1 degrees Fahrenheit  2018

 

 Heart Rate  68 bpm  2018

 

 Respiratory Rate  18   2018

 

 BMI  19.246 kg/m2  2018

 

 Blood pressure systolic  106 mmHg  2018

 

 Blood pressure diastolic  66 mmHg  2018







MEDICATIONS







 Medication  Instructions  Dosage  Frequency  Start Date  End Date  Duration  
Status

 

 Ferrous Sulfate 325 (65 Fe) MG  Orally Once a day  1 tablet  24h  
     30 day(s)  Active

 

 Prenatal 28-0.8 MG  Orally daily  1  24h          Not-Taking

 

 Prenatal Vitamin 27-0.8 MG  Orally Once a day  as directed  24h  10 Derik, 2018 
    30 days  Active







RESULTS







 Name  Result  Date  Reference Range

 

 UA OB DIP (IN HOUSE)     2018   

 

 Glucose  negative      

 

 Protein  negative      







PROCEDURES







 Procedure  Date Ordered  Result  Body Site

 

 URINE-NO MICRO  2018      







INSTRUCTIONS





MEDICATIONS ADMINISTERED

No Known Medications



MEDICAL (GENERAL) HISTORY







 Type  Description  Date

 

 Medical History  depression   

 

 Medical History  anxiety   

 

 Medical History  PTSD   

 

 Surgical History  part of spleen removed  

 

 Surgical History  corneal transplant  2016

 

 Hospitalization History  surgery   

 

 Hospitalization History  suicidal ([place])

## 2018-08-31 VITALS — SYSTOLIC BLOOD PRESSURE: 118 MMHG | DIASTOLIC BLOOD PRESSURE: 72 MMHG

## 2018-08-31 VITALS — SYSTOLIC BLOOD PRESSURE: 114 MMHG | DIASTOLIC BLOOD PRESSURE: 74 MMHG

## 2018-08-31 VITALS — SYSTOLIC BLOOD PRESSURE: 128 MMHG | DIASTOLIC BLOOD PRESSURE: 75 MMHG

## 2018-08-31 VITALS — DIASTOLIC BLOOD PRESSURE: 85 MMHG | SYSTOLIC BLOOD PRESSURE: 124 MMHG

## 2018-08-31 LAB
BASOPHILS # BLD AUTO: 0 10^3/UL (ref 0–0.1)
BASOPHILS NFR BLD AUTO: 0 % (ref 0–10)
EOSINOPHIL # BLD AUTO: 0.3 10^3/UL (ref 0–0.3)
EOSINOPHIL NFR BLD AUTO: 3 % (ref 0–10)
ERYTHROCYTE [DISTWIDTH] IN BLOOD BY AUTOMATED COUNT: 17 % (ref 10–14.5)
HCT VFR BLD CALC: 31 % (ref 35–52)
HGB BLD-MCNC: 10.5 G/DL (ref 11.5–16)
LYMPHOCYTES # BLD AUTO: 1.5 X 10^3 (ref 1–4)
LYMPHOCYTES NFR BLD AUTO: 15 % (ref 12–44)
MANUAL DIFFERENTIAL PERFORMED BLD QL: NO
MCH RBC QN AUTO: 28 PG (ref 25–34)
MCHC RBC AUTO-ENTMCNC: 34 G/DL (ref 32–36)
MCV RBC AUTO: 82 FL (ref 80–99)
MONOCYTES # BLD AUTO: 0.9 X 10^3 (ref 0–1)
MONOCYTES NFR BLD AUTO: 9 % (ref 0–12)
NEUTROPHILS # BLD AUTO: 7.1 X 10^3 (ref 1.8–7.8)
NEUTROPHILS NFR BLD AUTO: 73 % (ref 42–75)
PLATELET # BLD: 101 10^3/UL (ref 130–400)
PMV BLD AUTO: 12.3 FL (ref 7.4–10.4)
RBC # BLD AUTO: 3.81 10^6/UL (ref 4.35–5.85)
WBC # BLD AUTO: 9.8 10^3/UL (ref 4.3–11)

## 2018-08-31 RX ADMIN — IBUPROFEN SCH MG: 600 TABLET ORAL at 10:52

## 2018-08-31 RX ADMIN — IBUPROFEN SCH MG: 600 TABLET ORAL at 17:00

## 2018-08-31 RX ADMIN — VITAMIN A ACETATE, .BETA.-CAROTENE, ASCORBIC ACID, CHOLECALCIFEROL, .ALPHA.-TOCOPHEROL ACETATE, DL-, THIAMINE MONONITRATE, RIBOFLAVIN, NIACINAMIDE, PYRIDOXINE HYDROCHLORIDE, FOLIC ACID, CYANOCOBALAMIN, CALCIUM CARBONATE, FERROUS FUMARATE, ZINC OXIDE, AND CUPRIC OXIDE SCH EA: 2000; 2000; 120; 400; 22; 1.84; 3; 20; 10; 1; 12; 200; 27; 25; 2 TABLET ORAL at 09:04

## 2018-08-31 RX ADMIN — IBUPROFEN SCH MG: 600 TABLET ORAL at 04:13

## 2018-08-31 RX ADMIN — IBUPROFEN SCH MG: 600 TABLET ORAL at 22:39

## 2018-08-31 RX ADMIN — FERROUS SULFATE TAB 325 MG (65 MG ELEMENTAL FE) SCH MG: 325 (65 FE) TAB at 09:04

## 2018-08-31 NOTE — PROGRESS NOTE (SOAP)
Subjective


Subjective/Events-last exam


Patient doing well this AM. Tolerating PO. Having some struggles with breast 

feeding. Pain well controlled with PO diet


Review of Systems


Date Seen by Provider:  Aug 31, 2018


Time Seen by Provider:  09:45


Pulmonary:  No Dyspnea, No Cough


Cardiovascular:  No: Chest Pain, Palpitations


Gastrointestinal:  No: Nausea, Vomiting





Objective


Exam


Last Set of Vital Signs





Vital Signs








  Date Time  Temp Pulse Resp B/P (MAP) Pulse Ox O2 Delivery O2 Flow Rate FiO2


 


18 09:00     98 Room Air  


 


18 09:00 98.8 82 18 124/85 (98)    





Capillary Refill : Less Than 3 Seconds


I&O











Intake and Output 


 


 18





 00:00


 


Intake Total 4260 ml


 


Balance 4260 ml


 


 


 


Intake Oral 500 ml


 


IV Total 3760 ml








General:  Alert, Oriented X3, Cooperative, No Acute Distress


HEENT:  Mucous Memb Moist/Pink


Lungs:  Clear to Auscultation, Normal Air Movement


Heart:  Regular Rate


Abdomen:  Normal Bowel Sounds, Soft, No Tenderness, No Hepatosplenomegaly, 

Other (fundus firm and below umbilicus)


Extremities:  No Edema, No Tenderness/Swelling





Results/Procedures


Lab


Laboratory Tests


18 06:00: 


White Blood Count 9.8, Red Blood Count 3.81L, Hemoglobin 10.5L, Hematocrit 31L, 

Mean Corpuscular Volume 82, Mean Corpuscular Hemoglobin 28, Mean Corpuscular 

Hemoglobin Concent 34, Red Cell Distribution Width 17.0H, Platelet Count 101L, 

Mean Platelet Volume 12.3H, Neutrophils (%) (Auto) 73, Lymphocytes (%) (Auto) 15

, Monocytes (%) (Auto) 9, Eosinophils (%) (Auto) 3, Basophils (%) (Auto) 0, 

Neutrophils # (Auto) 7.1, Lymphocytes # (Auto) 1.5, Monocytes # (Auto) 0.9, 

Eosinophils # (Auto) 0.3, Basophils # (Auto) 0.0





Assessment/Plan


Assessment/Plan


Assessment & Plan


21 yo G1 now P1 PPD #1 via  of female infant





Plan


- Routine post partum care


- Iron def anemia: continue iron supplementation


- Breast feeding


- Pain controlled with PO meds


- Plan for home tomorrow





Clinical Quality Measures


DVT/VTE Risk/Contraindication:


Risk Factor Score Per Nursin


RFS Level Per Nursing on Admit:  1=Low/No VTE PPX











TAE BLANCO MD Aug 31, 2018 11:02

## 2018-08-31 NOTE — ANESTHESIA-REGIONAL POST-OP
Regional


Patient Condition


Mental Status:  Alert, Oriented x3


Circulation:  Same as Pre-Op


Headache:  Absent


Sensation:  Full Recovery


Motor Block:  Absent





Post Op Complications


Complications


None





Follow Up Care/Instructions


Patient Instructions


None needed.





Anesthesia/Patient Condition


Patient is doing well, no complaints, stable vital signs, no apparent adverse 

anesthesia problems.   


No complications reported per nursing.











AUNDREA MEJIA CRNA Aug 31, 2018 07:13

## 2018-09-01 VITALS — SYSTOLIC BLOOD PRESSURE: 138 MMHG | DIASTOLIC BLOOD PRESSURE: 92 MMHG

## 2018-09-01 VITALS — DIASTOLIC BLOOD PRESSURE: 81 MMHG | SYSTOLIC BLOOD PRESSURE: 131 MMHG

## 2018-09-01 RX ADMIN — VITAMIN A ACETATE, .BETA.-CAROTENE, ASCORBIC ACID, CHOLECALCIFEROL, .ALPHA.-TOCOPHEROL ACETATE, DL-, THIAMINE MONONITRATE, RIBOFLAVIN, NIACINAMIDE, PYRIDOXINE HYDROCHLORIDE, FOLIC ACID, CYANOCOBALAMIN, CALCIUM CARBONATE, FERROUS FUMARATE, ZINC OXIDE, AND CUPRIC OXIDE SCH EA: 2000; 2000; 120; 400; 22; 1.84; 3; 20; 10; 1; 12; 200; 27; 25; 2 TABLET ORAL at 10:30

## 2018-09-01 RX ADMIN — FERROUS SULFATE TAB 325 MG (65 MG ELEMENTAL FE) SCH MG: 325 (65 FE) TAB at 10:30

## 2018-09-01 RX ADMIN — IBUPROFEN SCH MG: 600 TABLET ORAL at 10:30

## 2018-09-01 RX ADMIN — IBUPROFEN SCH MG: 600 TABLET ORAL at 05:20

## 2018-09-01 NOTE — DISCHARGE SUMMARY
Diagnosis/Chief Complaint


Date of Admission


Aug 29, 2018 at 19:21


Date of Discharge


2018


Admission Diagnosis


Admission Diagnosis


Induction of Labor


40 weeks Gestation


Positive anti-Byron antibody


GBS Positive





Discharge Diagnosis


Induction of Labor


40 weeks Gestation


Positive anti-Byron antibody


GBS Positive


s/p spontaneous vaginal delivery


breastfeeding mother





Chief Complaint/HPI


Chief Complaint/HPI


OB-Reason for Admission/Chief:  Induction of Labor


Hx :  1


Hx Para:  0


Expected Date of Delivery:  Aug 27, 2018


Gestational Age in Weeks:  40


Gestational Age in Days:  2


Indication for induction:  post dates


History of Labs


B+, antibody POS for anti-K, RI. HIV/hepB/RPR NR. GC/chlamydia neg. Glucola 

normal. GBS Pos.


Other


Pregnancy complicated by anti-K antibody initially at 1:4, consulted with MFM 

and FOB had negative antigen testing, MFM recommended q2 mo titers and initiate 

MCA dopplers if increased to 1:16. Titer never increased from 1:4, last check 

on .





Discharge Summary-Simple/Stand


Procedures


Vaginal Delivery per Dr. Gonzales, 2018 at 1149


Discharge Physical Examination


Allergies:  


Coded Allergies:  


     oxycodone (Unverified  Allergy, Unknown, NAUSEA, 18)


Vitals & I&Os





Vital Sign - Last 12Hours








  Date Time  Temp Pulse Resp B/P (MAP) Pulse Ox O2 Delivery O2 Flow Rate FiO2


 


18 05:20 98.6 73 18 138/92 (107) 98 Room Air  








General Appearance:  Alert, Oriented X3, Cooperative, No Acute Distress


HEENT:  Atraumatic, EOMI, Mucous Memb Moist/[place]


Respiratory:  Clear to Auscultation


Cardiovascular:  Regular Rate, Normal S1, Normal S2


Abdominal:  Normal Bowel Sounds, Soft, No Tenderness, Other (post gravid)


Extremities:  No Clubbing, No Cyanosis, No Tenderness/Swelling


Skin:  No Rashes, No Significant Lesion


Neuro:  Normal Gait, Normal Speech, Normal Tone, Sensation Intact, Cranial 

Nerves 3-12 NL


Psych/Mental Status:  Mental Status NL, Mood NL





Hospital Course


See final discharge diagnosis.


Labs





Laboratory Tests








Test


 18


06:00 Range/Units


 


 


White Blood Count


 9.8 


 4.3-11.0


10^3/uL


 


Red Blood Count


 3.81 L


 4.35-5.85


10^6/uL


 


Hemoglobin 10.5 L 11.5-16.0  G/DL


 


Hematocrit 31 L 35-52  %


 


Mean Corpuscular Volume 82  80-99  FL


 


Mean Corpuscular Hemoglobin 28  25-34  PG


 


Mean Corpuscular Hemoglobin


Concent 34 


 32-36  G/DL





 


Red Cell Distribution Width 17.0 H 10.0-14.5  %


 


Platelet Count


 101 L


 130-400


10^3/uL


 


Mean Platelet Volume 12.3 H 7.4-10.4  FL


 


Neutrophils (%) (Auto) 73  42-75  %


 


Lymphocytes (%) (Auto) 15  12-44  %


 


Monocytes (%) (Auto) 9  0-12  %


 


Eosinophils (%) (Auto) 3  0-10  %


 


Basophils (%) (Auto) 0  0-10  %


 


Neutrophils # (Auto) 7.1  1.8-7.8  X 10^3


 


Lymphocytes # (Auto) 1.5  1.0-4.0  X 10^3


 


Monocytes # (Auto) 0.9  0.0-1.0  X 10^3


 


Eosinophils # (Auto)


 0.3 


 0.0-0.3


10^3/uL


 


Basophils # (Auto)


 0.0 


 0.0-0.1


10^3/uL











Discharge


Condition at discharge


stable


Instructions to patient/family


Please see electronic discharge instructions given to patient.


Discharge Medications


Reviewed and agree with Discharge Medication list on patient's Discharge 

Instruction sheet





Clinical Quality Measures


DVT/VTE Risk/Contraindication:


Risk Factor Score Per Nursin


RFS Level Per Nursing on Admit:  1=Low/No VTE PPX





Copy


Copies To 1:   TRESSA GONZALES MD, MARGARET E DO Sep 1, 2018 10:11

## 2018-09-01 NOTE — DISCHARGE INSTRUCTIONS
Discharge Inst-[facility]


Depart Medications


New, Converted or Re-Newed RX:  Transmitted to Pharmacy


New Medications:  


Ibuprofen (Ibu) 600 Mg Tablet


600 MG PO Q6H for 14 Days, #56 TAB 1 Refill





 


Continued Medications:  


Ferrous Sulfate (Iron) 325 Mg Tablet


325 MG PO DAILY for 30 Days, #30 TAB (This prescription has been renewed)





Ges723/Iron Fumarate/FA/Dss (Prenatal 19 Tablet) 1 Each Tablet


1 EACH PO DAILY, TAB











Follow Up/Instructions


Goal/Follow Up:  


Dr. Gonzales in 6 weeks


Patient Instructions:  


-medications as prescribed


-contact office with any questions or concerns





Activity


Activity:  Activity as Tolerated


Driving Instructions:  No Driving for 1 Week


NO SMOKING:  NO SMOKING


Nothing Inside Vagina:  No Douching, No [place], No Tampons





Diet


Discharge Diet:  No Restrictions


Return to The Hospital For:  


chest pain or pressure, shortness of breath not improved with rest, temp


>101, foul smelling vaginal discharge or odor, clots larger than the size


of your fist, heavy vaginal bleeding that saturates more than 1 pad per


hour for 2 hours in a row, severe pain not controlled by prescribed


medications, severe headache not controlled by prescribed medication, if


directed by on call provider or with any other emergent complaints or


concerns


Symptoms to Report to :  Bleeding Excessive, Eyesight Changes, Pain Increased

, Fever Over 101 Degrees F, Pain/Pressure in Chest, Pain/Pressure in Jaw, 

Vaginal Bleeding Increase, Vaginal Discharge Foul, Questions/Concerns, 

Shortness of Breath


For Any Problems or Questions:  Contact Your Physician, Go to Emergency Room, 

Go to Quick Care





Skin/Wound Care


Bathing Instructions:  Tub, Shower


Copies To 1:   TRESSA GONZALES MD, MARGARET E DO Sep 1, 2018 11:29

## 2021-01-13 ENCOUNTER — HOSPITAL ENCOUNTER (EMERGENCY)
Dept: HOSPITAL 75 - ER | Age: 26
Discharge: HOME | End: 2021-01-13
Payer: MEDICAID

## 2021-01-13 VITALS — DIASTOLIC BLOOD PRESSURE: 79 MMHG | SYSTOLIC BLOOD PRESSURE: 139 MMHG

## 2021-01-13 VITALS — HEIGHT: 60.98 IN | BODY MASS INDEX: 24.56 KG/M2 | WEIGHT: 130.07 LBS

## 2021-01-13 DIAGNOSIS — Z88.5: ICD-10-CM

## 2021-01-13 DIAGNOSIS — F17.210: ICD-10-CM
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
## 2021-01-13 DIAGNOSIS — S62.325A: Primary | ICD-10-CM

## 2021-01-13 PROCEDURE — 73130 X-RAY EXAM OF HAND: CPT

## 2021-01-13 NOTE — DIAGNOSTIC IMAGING REPORT
INDICATION: Left hand injury with pain.



FINDINGS:  AP, oblique and lateral views of the left hand reveal

mildly angulated, mildly impacted fracture at the distal shaft of

fourth metacarpal. No intra-articular offset is identified. There

may be mild foreshortening of the fourth finger. No other

definite fracture or malalignment is identified.



IMPRESSION: Mildly overriding, slightly angulated distal fourth

metacarpal shaft fracture. Fracture line extends to the articular

surface of the fourth metacarpal head without significant offset.



Dictated by: 



  Dictated on workstation # [id number]

## 2021-01-13 NOTE — ED UPPER EXTREMITY
General


Chief Complaint:  Upper Extremity


Stated Complaint:  L HAND PAIN


Source:  patient


Exam Limitations:  no limitations





History of Present Illness


Date Seen by Provider:  2021


Time Seen by Provider:  12:59


Initial Comments


Patient presents ER by private conveyance with chief complaint of fall about 

5:00 this morning while trying to make her daughter's bottle stretched left 

hand.  She is having some swelling and decreased mobility as well as decreased 

sensation in her third and fourth digit.  No previous fracture or surgery to 

this area.





Allergies and Home Medications


Allergies


Coded Allergies:  


     oxycodone (Unverified  Allergy, Unknown, NAUSEA, 18)





Home Medications


Ferrous Sulfate 325 Mg Tablet, 325 MG PO DAILY


   Prescribed by: JELANI UGARTE on 18 1125


Hydrocodone/Acetaminophen 1 Each Tablet, 1 EACH PO Q6H PRN for PAIN-BREAKTHROUGH


   Prescribed by: SARAI HENDERSON on 21 1401


Ibuprofen 600 Mg Tablet, 600 MG PO Q6H


   Prescribed by: JELANI UGARTE on 18 1125


Cyf403/Iron Fumarate/FA/Dss 1 Each Tablet, 1 EACH PO DAILY, (Reported)





Patient Home Medication List


Home Medication List Reviewed:  Yes





Review of Systems


Constitutional:  No chills, No diaphoresis


EENTM:  No ear discharge, No hearing loss


Respiratory:  No cough, No short of breath


Cardiovascular:  No Hx of Intervention, No palpitations


Gastrointestinal:  No abdominal pain, No constipation, No diarrhea


LMP:  Dec 13, 2020


Birth Control/STD Prophylaxis:  None


Musculoskeletal:  No back pain, No joint pain





All Other Systems Reviewed


Negative Unless Noted:  Yes





Past Medical-Social-Family Hx


Patient Social History


Alcohol Use:  Occasionally Uses


Alcohol Beverage of Choice:  Beer


Drug of Choice:  MARIJUANA


Smoking Status:  Current Everyday Smoker


Type Used:  Cigarettes


2nd Hand Smoke Exposure:  Yes


Recent Hopitalizations:  Yes (only at age 6 when spleen repaired and 

appendectomy )





Immunizations Up To Date


Tetanus Booster (TDap):  Less than 5yrs


PED Vaccines UTD:  Yes





Seasonal Allergies


Seasonal Allergies:  No





Past Medical History


Surgeries:  Yes (repaired spleen and appendectomy)


Abdominal, Eye Surgery


Respiratory:  No


Cardiac:  No


Neurological:  No


Reproductive Disorders:  No


Female Reproductive Disorders:  Denies


Sexually Transmitted Disease:  No


HIV/AIDS:  No


Genitourinary:  No


UTI-Chronic


Gastrointestinal:  Yes (SPLEEN REPAIR)


Musculoskeletal:  No


Endocrine:  No


HEENT:  No


Cancer:  No


Psychosocial:  Yes


Anxiety


Integumentary:  No


Blood Disorders:  No


Adverse Reaction/Blood Tranf:  No





Physical Exam


Vital Signs





Vital Signs - First Documented








 21





 13:03


 


Temp 36.4


 


Pulse 85


 


Resp 18


 


B/P (MAP) 139/72 (94)


 


Pulse Ox 99





Capillary Refill :


Height, Weight, BMI


Height: 5'5.00"


Weight: 154lbs. 2.0oz. 69.436839za; 25.7 BMI


Method:Stated


General Appearance:  WD/WN, mild distress


Neck:  full range of motion, normal inspection


Cardiovascular:  normal peripheral pulses, regular rate, rhythm


Respiratory:  no respiratory distress, no accessory muscle use


Elbow/Forearm:  normal inspection, non-tender, no evidence of injury, normal 

ROM, Bilateral


Wrist:  Yes normal inspection, Yes non-tender, Yes no evidence of injury, Yes 

normal ROM


Hand:  Left, bone tenderness (Metatarsals), ecchymosis, limited ROM (Middle 2 

fingers have limited range of flexion but full extension secondary to pain), 

soft tissue tenderness (Dorsal left hand), swelling (Dorsal left hand)


Neurologic/Psychiatric:  alert, normal mood/affect, oriented x 3





Procedures/Interventions





   Suture Size:  4-0


Splinting and Joint Reduction :  


   Location:  Left hand


   Pre-Proc Neuro Vasc Exam:  normal


   Post-Proc Neuro Vasc Exam:  normal


Progress


Colle splint with 4 inch Ace wrap





Progress/Results/Core Measures


Results/Orders


My Orders





Orders - SARAI HENDERSON


Ibuprofen  Tablet (Motrin  Tablet) (21 13:15)


Hand, Left, 3 Views (21 13:11)





Medications Given in ED





Current Medications








 Medications  Dose


 Ordered  Sig/Dheeraj


 Route  Start Time


 Stop Time Status Last Admin


Dose Admin


 


 Ibuprofen  800 mg  ONCE  ONCE


 PO  21 13:15


 21 13:16 DC 21 13:22


800 MG








Vital Signs/I&O











 21





 13:03


 


Temp 36.4


 


Pulse 85


 


Resp 18


 


B/P (MAP) 139/72 (94)


 


Pulse Ox 99











Progress


Progress Note :  


   Time:  :21


Progress Note


Ice pack, Motrin and to 3 views left hand.





Diagnostic Imaging





   Diagonstic Imaging:  Xray


   Plain Films/CT/US/NM/MRI:  hand (Left)


Comments


Distal end of the fourth metacarpal nondisplaced, minimally angulated fracture. 

Boxer's fracture


NAME:   NELDA WU


MED REC#:   [id number]


ACCOUNT#:   [id number]


PT STATUS:   REG ER


:   1995


PHYSICIAN:   SARAI HENDERSON MD


ADMIT DATE:   21/ER


                                   ***Draft***


Date of Exam:21





HAND, LEFT, 3 VIEWS








INDICATION: Left hand injury with pain.





FINDINGS:  AP, oblique and lateral views of the left hand reveal


mildly angulated, mildly impacted fracture at the distal shaft of


fourth metacarpal. No intra-articular offset is identified. There


may be mild foreshortening of the fourth finger. No other


definite fracture or malalignment is identified.





IMPRESSION: Mildly overriding, slightly angulated distal fourth


metacarpal shaft fracture. Fracture line extends to the articular


surface of the fourth metacarpal head without significant offset.





  Dictated on workstation # [id number]








Dict:   21 1342


Trans:   21 1349


[facility] [id number]





Interpreted by:     ADYR KERR MD


Electronically signed by:


   Reviewed:  Reviewed by Me





Departure


Impression





   Primary Impression:  


   Fracture, metacarpal, neck


   Qualified Codes:  S62.365A - Nondisplaced fracture of neck of fourth 

   metacarpal bone, left hand, initial encounter for closed fracture


Disposition:  01 HOME, SELF-CARE


Condition:  Stable





Departure-Patient Inst.


Decision time for Depature:  13:56


Referrals:  


TRESSA FULLER MD (PCP/Family)


Primary Care Physician








SCHWAB,TERRY D MD


Patient Instructions:  Boxer's Fracture (DC)





Add. Discharge Instructions:  


Ice for 20 minutes at least every 2 hours for the first 2 to 3 days.


Elevate your hand above the level of your heart for swelling and pain control.


Keep the splint and Ace wrap on at all times for compression, immobilization and

pain control except to bathe.


Next week follow-up with Dr. Schwab, orthopedic surgery.  Call for an 

appointment today.


Tylenol 1000 mg every 8 hours as necessary for pain.


Ibuprofen 800 mg every 8 hours as necessary for pain.


Hydrocodone 1 tablet every 6 hours as necessary for breakthrough, severe pain.  

Will cause drowsiness and constipation.





All discharge instructions reviewed with patient and/or family. Voiced 

understanding.


Scripts


Hydrocodone/Acetaminophen (Hydrocodone-Acetamin 5-325 mg) 1 Each Tablet


1 EACH PO Q6H PRN for PAIN-BREAKTHROUGH, #14 TAB 0 Refills


   Prov: SARAI HENDERSON         21


Work/School Note:  Family Work Note,    Patient Received Medical Care In the 

Emergency Department On:  2021


   Patient Will Be Able to Return to Work/School On:  2021


   Patient Restrictions:  none


Work Release Form   Date Seen in the Emergency Department:  2021


   Return to Work:  2021


   Restrictions:  Need Release from Doctor


   Other Restrictions Listed Below:  May not use left hand until cleared by 

physician or 2021.





Copy


Copies To 1:   SCHWAB,TERRY D MD WELLER,TITUS J                 2021 13:12

## 2021-01-18 ENCOUNTER — HOSPITAL ENCOUNTER (OUTPATIENT)
Dept: HOSPITAL 75 - ORTHO | Age: 26
End: 2021-01-18
Attending: ORTHOPAEDIC SURGERY
Payer: MEDICAID

## 2021-01-18 DIAGNOSIS — S62.335A: Primary | ICD-10-CM

## 2021-02-01 ENCOUNTER — HOSPITAL ENCOUNTER (OUTPATIENT)
Dept: HOSPITAL 75 - ORTHO | Age: 26
End: 2021-02-01
Attending: ORTHOPAEDIC SURGERY
Payer: MEDICAID

## 2021-02-01 DIAGNOSIS — S62.335A: Primary | ICD-10-CM
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
## 2021-02-01 PROCEDURE — 73130 X-RAY EXAM OF HAND: CPT

## 2021-02-01 NOTE — DIAGNOSTIC IMAGING REPORT
INDICATION: Followup fracture.



COMPARISON: 01/13/2021



FINDINGS: Multiple radiographic views of the left hand were

obtained. Again identified is nonacute fracture involving the

distal 4th metacarpal. There is mild palmar subluxation of the

distal fracture fragment as well as mild angulation with the apex

projecting posteriorly. This is stable compared to prior exam. No

significant bridging callus formation is seen to suggest

significant interval healing.



Note is also made of focal cortical lucency involving the

posterior margins of the proximal metacarpal on the lateral view.

This is felt to correspond to the 3rd metacarpal, although

fracture line is much less conspicuous on the PA and oblique

views. Joint spaces are maintained. No unexpected radiopaque

foreign bodies are seen.



IMPRESSION:

1. Nonacute fractures of the 3rd and 4th metacarpals as described

above.



Dictated by: 



  Dictated on workstation # [id number]

## 2021-03-03 ENCOUNTER — HOSPITAL ENCOUNTER (OUTPATIENT)
Dept: HOSPITAL 75 - ORTHO | Age: 26
Discharge: HOME | End: 2021-03-03
Attending: ORTHOPAEDIC SURGERY
Payer: MEDICAID
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
## 2021-03-03 DIAGNOSIS — S62.335A: Primary | ICD-10-CM

## 2021-03-03 PROCEDURE — 73130 X-RAY EXAM OF HAND: CPT

## 2021-03-03 NOTE — DIAGNOSTIC IMAGING REPORT
Indication: Left hand injury



AP, oblique, and lateral views of the left hand are obtained and

compared with 02/01/2021



There is a fracture of the distal aspect of the 4th metacarpal,

with mild impaction.  The appearance and alignment are not

changed compared to the prior study. There is a nondisplaced

fracture of the base of the 3rd metacarpal which is in stable

alignment. Remaining bony structures are intact.



IMPRESSION:

Stable alignment of fracture distal aspect of the 4th metacarpal

and fracture proximal aspect of 3rd metacarpal.



Dictated by: 



  Dictated on workstation # [id number]

## 2023-11-07 NOTE — HISTORY & PHYSICAL-OB
OB - Chief Complaint & HPI


Date/Time


Date of Admission:


Date of Admission:  Aug 29, 2018 at 7:21 pm


Time Seen by Provider:  20:15





Chief Complaint/History


OB-Reason for Admission/Chief:  Induction of Labor


Hx :  1


Hx Para:  0


Expected Date of Delivery:  Aug 27, 2018


Gestational Age in Weeks:  40


Gestational Age in Days:  2


Indication for induction:  post dates


History of Labs


B+, antibody POS for anti-K, RI. HIV/hepB/RPR NR. GC/chlamydia neg. Glucola 

normal. GBS Pos.


Other


Pregnancy complicated by anti-K antibody initially at 1:4, consulted with MFM 

and FOB had negative antigen testing, MFM recommended q2 mo titers and initiate 

MCA dopplers if increased to 1:16. Titer never increased from 1:4, last check 

on .





Allergies and Home Medications


Allergies


Coded Allergies:  


     oxycodone (Unverified  Allergy, Unknown, NAUSEA, 18)





Home Medications


Ferrous Sulfate 325 Mg Tablet, 325 MG PO DAILY, (Reported)


[id number]/Iron Fumarate/FA/Dss 1 Each Tablet, 1 EACH PO DAILY, (Reported)





Patient Home Medication List


Home Medication List Reviewed:  Yes





OB - History


Hx of Present Pregnancy


Prenatal Care:  Yes


Ultrasounds:  Normal mid trimester US


Obstetrical Complications:  Other (see HPI)





Obstetrical History


Hx :  1


Hx Para:  0





Delivery History


Hx Blood Disorders:  No





Patient Past Medical History





PMHx:


Depression


Anxiety


Anemia





PSurgHx:


Left corneal transplant


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





Social History/Family History


HIV/AIDS:  No


Recent Infectious Disease Expo:  No


Sexually Transmitted Disease:  No


Alcohol Use:  Denies Use


Recreational Drug Use:  No (history of THC before pregnancy)


Smoking Cessation:  Former smoker


2nd Hand Smoke Exposure:  Yes





Immunizations


Tetanus Booster (TDap):  Less than 5yrs


Rubella:  immune


RPR/VDRL:  Negative


GBS Status:  Positive


HBsAG:  Negative





OB - Admission Exam


Physical Exam


HEENT:  NCAT


Abdomen:  Non tender


Extremities:  Normal


Cervical Dilatation:  3cm


Effacement:  0%


Station:  -1


Membranes:  Intact


Fetal Heart Rate:  150's


Accelerations:  Accelerations Present


Decelerations:  Variable Decelerations


Short Term Variability:  Present


Long Term Variability:  Marked (>25)


Contractions on Admission:  6-10 Minutes Apart


Intensity:  Mild





Montgomery Scoring Tool (Modified)


Dilation (cm):  3-4cm (2)


Effacement (%):  0-30%    (0)


Fetal Descent/Station:  -1,0     (2)


Cervix Consistency:  Medium(1)


Cervix Position:  Posterior  (0)


Subtract 1 point for:  Postdate pregnancy (-1), Nulliparity (-1)


Montgomery Score:  3





OB - Assessment/Plan/Diagnosis


Assessment


Assessment:  induction of labor


Admission Dx


40 weeks gestation


Induction of labor


Anti-K antibody positive


Iron deficiency anemia


GBS positive


Admission Status:  Inpatient Order (span 2 midnights)


Reason for Inpatient Admission:  


Labor and delivery and postpartum course





Plan


Plan:  Induction


Induction Method:  per Misoprostol Protocol





Copy


Copies To 1:   TRESSA FULLER MD, BETHANY N MD Aug 29, 2018 8:42 pm Yes